# Patient Record
Sex: MALE | Race: WHITE | NOT HISPANIC OR LATINO | Employment: OTHER | URBAN - METROPOLITAN AREA
[De-identification: names, ages, dates, MRNs, and addresses within clinical notes are randomized per-mention and may not be internally consistent; named-entity substitution may affect disease eponyms.]

---

## 2018-06-18 ENCOUNTER — OFFICE VISIT (OUTPATIENT)
Dept: NEUROLOGY | Facility: CLINIC | Age: 78
End: 2018-06-18

## 2018-06-18 VITALS
SYSTOLIC BLOOD PRESSURE: 124 MMHG | BODY MASS INDEX: 27.62 KG/M2 | WEIGHT: 203.93 LBS | HEIGHT: 72 IN | DIASTOLIC BLOOD PRESSURE: 68 MMHG

## 2018-06-18 DIAGNOSIS — G43.109 MIGRAINE AURA WITHOUT HEADACHE: ICD-10-CM

## 2018-06-18 DIAGNOSIS — R47.89 EPISODE OF CHANGE IN SPEECH: Primary | ICD-10-CM

## 2018-06-18 PROCEDURE — 99215 OFFICE O/P EST HI 40 MIN: CPT | Performed by: PHYSICIAN ASSISTANT

## 2018-06-18 NOTE — PROGRESS NOTES
"Subjective     Chief Complaint: episode of difficulty with speech      History of Present Illness   Bryan Khan is a 77 y.o. male who returns to clinic today for evaluation of an episode of difficulty with speech. On 2/23/18, he noted very mild word finding difficulties shortly after working out at home. He denies any additional associated symptoms, including slurred speech, weakness, dysphagia or changes in vision. It is unclear how long this lasted. His blood pressure was noted to be 210/100 at this time. He was seen at [facility] where a head CT was unremarkable as well an echo and CTA of the head and neck. He was diagnosed with possible hypertensive urgency/emergency. He is currently taking ASA 81mg daily.     He was previously seen by Dr. Breen for migraines in 7/16. He initially noted symptoms several years ago marked by a visual aura beginning on the right and then spreading to the left. This typically lasts for approximately 20 minutes. Afterwards, he may or may not experience a headache. These episodes are very infrequent and may occur 1-2 times a year. He denies any additional associated symptoms or modifying factors.       I have reviewed and confirmed the past family, social and medical history as accurate on 6/19/18.     Review of Systems   Constitutional: Negative.    HENT: Negative.    Eyes: Negative.    Respiratory: Negative.    Cardiovascular: Negative.    Gastrointestinal: Negative.    Endocrine: Negative.    Genitourinary: Negative.    Musculoskeletal: Negative.    Skin: Negative.    Allergic/Immunologic: Negative.    Hematological: Negative.    Psychiatric/Behavioral: Negative.        Objective     Ht 182.9 cm (72.01\")   Wt 92.5 kg (203 lb 14.8 oz)   BMI 27.65 kg/m²     General appearance today is normal.       Physical Exam   Neurological: He has normal strength. He has a normal Finger-Nose-Finger Test. Gait normal.   Reflex Scores:       Tricep reflexes are 1+ on the right side " and 1+ on the left side.       Bicep reflexes are 1+ on the right side and 1+ on the left side.       Brachioradialis reflexes are 1+ on the right side and 1+ on the left side.       Patellar reflexes are 1+ on the right side and 1+ on the left side.  Psychiatric: His speech is normal.        Neurologic Exam     Mental Status   Speech: speech is normal   Level of consciousness: alert  Normal comprehension.     Cranial Nerves   Cranial nerves II through XII intact.     Motor Exam   Muscle bulk: normal  Overall muscle tone: normal    Strength   Strength 5/5 throughout.     Sensory Exam   Light touch normal.     Gait, Coordination, and Reflexes     Gait  Gait: normal    Coordination   Finger to nose coordination: normal    Tremor   Resting tremor: absent    Reflexes   Right brachioradialis: 1+  Left brachioradialis: 1+  Right biceps: 1+  Left biceps: 1+  Right triceps: 1+  Left triceps: 1+  Right patellar: 1+  Left patellar: 1+        Assessment/Plan   Bryan was seen today for migraine.    Diagnoses and all orders for this visit:    Episode of change in speech    Migraine aura without headache          Discussion/Summary   Bryan Khan returns to clinic today for evaluation of an episode of mild word finding difficulties. The etiology is unclear, though given his history I am concerned that his symptoms were related to hypertension. I offered to obtain an MRI of the brain, though this was very reasonably declined as he is currently doing well. His workup otherwise has been complete and appropriate. After discussing potential treatment options, it was elected to continue on ASA unchanged for stroke prevention. We discussed potentially adding a statin, though this was reasonably declined for now. As for his migraines, it was not elected to add any preventative medications at this time, at the episodes are so infrequent. He will then follow up in our clinic on an as needed basis.    I spent 35 minutes out of 45 minutes  face to face with the patient and discussing diagnosis, prognosis, diagnostic testing, evaluation, current status, treatment options and management as discussed above.       As part of this visit I reviewed radiology results and reviewed outside records.      Sho Red PA-C

## 2018-06-19 PROBLEM — R47.89 EPISODE OF CHANGE IN SPEECH: Status: ACTIVE | Noted: 2018-06-19

## 2018-06-19 PROBLEM — G43.109 MIGRAINE AURA WITHOUT HEADACHE: Status: ACTIVE | Noted: 2018-06-19

## 2021-07-13 ENCOUNTER — OFFICE VISIT (OUTPATIENT)
Dept: NEUROLOGY | Facility: CLINIC | Age: 81
End: 2021-07-13

## 2021-07-13 ENCOUNTER — LAB (OUTPATIENT)
Dept: LAB | Facility: HOSPITAL | Age: 81
End: 2021-07-13

## 2021-07-13 VITALS
TEMPERATURE: 98.4 F | HEART RATE: 67 BPM | DIASTOLIC BLOOD PRESSURE: 90 MMHG | HEIGHT: 70 IN | SYSTOLIC BLOOD PRESSURE: 180 MMHG | BODY MASS INDEX: 29.35 KG/M2 | WEIGHT: 205 LBS | OXYGEN SATURATION: 98 %

## 2021-07-13 DIAGNOSIS — R25.1 TREMOR: Primary | ICD-10-CM

## 2021-07-13 DIAGNOSIS — G43.109 MIGRAINE AURA WITHOUT HEADACHE: ICD-10-CM

## 2021-07-13 LAB — TSH SERPL DL<=0.05 MIU/L-ACNC: 1.29 UIU/ML (ref 0.27–4.2)

## 2021-07-13 PROCEDURE — 84443 ASSAY THYROID STIM HORMONE: CPT | Performed by: PHYSICIAN ASSISTANT

## 2021-07-13 PROCEDURE — 36415 COLL VENOUS BLD VENIPUNCTURE: CPT | Performed by: PHYSICIAN ASSISTANT

## 2021-07-13 PROCEDURE — 99204 OFFICE O/P NEW MOD 45 MIN: CPT | Performed by: PHYSICIAN ASSISTANT

## 2021-07-13 RX ORDER — PRIMIDONE 50 MG/1
50 TABLET ORAL 4 TIMES DAILY
Qty: 30 TABLET | Refills: 11 | Status: SHIPPED | OUTPATIENT
Start: 2021-07-13 | End: 2022-07-15

## 2021-07-13 RX ORDER — AMLODIPINE BESYLATE 5 MG/1
TABLET ORAL
COMMUNITY
Start: 2021-07-07 | End: 2022-11-07

## 2021-07-13 RX ORDER — IBUPROFEN 800 MG/1
TABLET ORAL
COMMUNITY
Start: 2021-06-29 | End: 2022-12-13 | Stop reason: ALTCHOICE

## 2021-07-13 RX ORDER — HYDROCHLOROTHIAZIDE 12.5 MG/1
CAPSULE, GELATIN COATED ORAL
COMMUNITY
Start: 2021-07-07 | End: 2022-12-01 | Stop reason: SDUPTHER

## 2021-07-13 RX ORDER — FLUOROMETHOLONE 0.1 %
SUSPENSION, DROPS(FINAL DOSAGE FORM)(ML) OPHTHALMIC (EYE)
COMMUNITY
Start: 2021-06-07 | End: 2022-12-13 | Stop reason: ALTCHOICE

## 2021-07-13 RX ORDER — LISINOPRIL 40 MG/1
40 TABLET ORAL DAILY
COMMUNITY
Start: 2021-07-07 | End: 2023-03-24 | Stop reason: ALTCHOICE

## 2021-07-14 NOTE — PROGRESS NOTES
Subjective       Chief Complaint: migraine, tremor      History of Present Illness   Bryan Khan is a 80 y.o. male who returns to clinic for evaluation of migraines. He initially noted symptoms several years ago marked by a visual aura beginning on the right and then spreading to the left. This typically lasts for approximately 20 minutes. Afterwards, he may or may not experience a headache. These episodes are very infrequent and may occur 1-2 times a year. He denies any additional associated symptoms or modifying factors.       He was previously evaluated in clinic in 6/2018 following an episode of difficulty with speech. On 2/23/18, he noted very mild word finding difficulties shortly after working out at home. He denies any additional associated symptoms, including slurred speech, weakness, dysphagia or changes in vision. It is unclear how long this lasted. His blood pressure was noted to be 210/100 at this time. He was seen at [facility] where a head CT was unremarkable as well an echo and CTA of the head and neck. He was diagnosed with possible hypertensive urgency/emergency. He is currently taking ASA 81mg daily. He denies     Today: Since his last visit in 6/2018, he denies any recurrent episodes of speech difficulty. He has noted 2-3 episodes of visual aura over the last year, with no clear trigger. 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 This has worsened over time. He primarily notes symptoms while eating, writing, and holding heavy objects in his hands such as a large cup of coffee. This is worse with caffeine intake. He denies any additional associated symptoms.      I have reviewed and confirmed the past family, social and medical history as accurate on 7/14/21.     Review of Systems   Constitutional: Negative.    HENT: Negative.    Eyes: Negative.    Respiratory: Negative.    Cardiovascular: Negative.    Gastrointestinal: Negative.   "  Endocrine: Negative.    Genitourinary: Negative.    Musculoskeletal: Negative.    Skin: Negative.    Allergic/Immunologic: Negative.    Neurological: Positive for tremors.   Hematological: Negative.        Objective     /90   Pulse 67   Temp 98.4 °F (36.9 °C)   Ht 177.8 cm (70\")   Wt 93 kg (205 lb)   SpO2 98%   BMI 29.41 kg/m²     General appearance today is normal.       Physical Exam  Neurological:      Coordination: Finger-Nose-Finger Test and Heel to Shin Test normal.      Gait: Gait is intact.      Deep Tendon Reflexes: Strength normal.   Psychiatric:         Speech: Speech normal.          Neurologic Exam     Mental Status   Speech: speech is normal   Level of consciousness: alert  Normal comprehension.     Cranial Nerves   Cranial nerves II through XII intact.     Motor Exam   Muscle bulk: normal  Overall muscle tone: normal    Strength   Strength 5/5 throughout.     Sensory Exam   Light touch normal.     Gait, Coordination, and Reflexes     Gait  Gait: normal    Coordination   Finger to nose coordination: normal  Heel to shin coordination: normal    Tremor   Resting tremor: absent  Intention tremor: mild.        Assessment/Plan   Diagnoses and all orders for this visit:    1. Tremor (Primary)  -     TSH    2. Migraine aura without headache    Other orders  -     primidone (MYSOLINE) 50 MG tablet; Take 1 tablet by mouth 4 (Four) Times a Day.  Dispense: 30 tablet; Refill: 11          Discussion/Summary   Bryan Khan comes to clinic today with a history of migraines as well as a history and examination most consistent with Essential Tremor. This was discussed at length. It was elected to obtain screening blood work. After discussing potential treatment options, it was elected to add primidone. He will then follow up in 6 months , or sooner if needed.   Total time of visit today: 45 minutes. As part of this visit I reviewed outside records. I also discussed diagnosis, prognosis, diagnostic " testing, evaluation, current status, treatment options and management as discussed above.             Sho Red PA-C

## 2022-01-26 ENCOUNTER — OFFICE VISIT (OUTPATIENT)
Dept: NEUROLOGY | Facility: CLINIC | Age: 82
End: 2022-01-26

## 2022-01-26 DIAGNOSIS — R25.1 TREMOR: Primary | ICD-10-CM

## 2022-01-26 DIAGNOSIS — G43.109 MIGRAINE AURA WITHOUT HEADACHE: ICD-10-CM

## 2022-01-26 PROCEDURE — 99215 OFFICE O/P EST HI 40 MIN: CPT | Performed by: PHYSICIAN ASSISTANT

## 2022-01-26 RX ORDER — DOXAZOSIN 2 MG/1
4 TABLET ORAL
COMMUNITY
Start: 2022-01-21 | End: 2022-12-13 | Stop reason: ALTCHOICE

## 2022-01-26 RX ORDER — BISOPROLOL FUMARATE AND HYDROCHLOROTHIAZIDE 5; 6.25 MG/1; MG/1
TABLET ORAL
COMMUNITY
Start: 2022-01-22 | End: 2022-08-16

## 2022-01-26 RX ORDER — TAMSULOSIN HYDROCHLORIDE 0.4 MG/1
CAPSULE ORAL
COMMUNITY

## 2022-01-27 NOTE — PROGRESS NOTES
Subjective         Chief Complaint: migraines, tremor     History of Present Illness   Bryan Khan is a 81 y.o. male who returns to clinic for evaluation of migraines. He initially noted symptoms several years ago marked by a visual aura beginning on the right and then spreading to the left. This typically lasts for approximately 20 minutes. Afterwards, he may or may not experience a headache. These episodes are very infrequent and may occur 1-2 times a year. He denies any additional associated symptoms or modifying factors.       He was previously evaluated in clinic in 6/2018 following an episode of difficulty with speech. On 2/23/18, he noted very mild word finding difficulties shortly after working out at home. He denies any additional associated symptoms, including slurred speech, weakness, dysphagia or changes in vision. It is unclear how long this lasted. His blood pressure was noted to be 210/100 at this time. He was seen at [facility] where a head CT was unremarkable as well an echo and CTA of the head and neck. He was diagnosed with possible hypertensive urgency/emergency. He is currently taking ASA 81mg daily.    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 This has worsened over time. He primarily notes symptoms while eating, writing, and holding heavy objects in his hands such as a large cup of coffee. This is worse with caffeine intake. He denies any additional associated symptoms.    Today: Since his last visit in 7/21, he notes that his tremor is somewhat improved with primidone 50mg nightly and is manageable.        I have reviewed and confirmed the past family, social and medical history as accurate on 1/26/22.     Review of Systems   Constitutional: Negative.    HENT: Negative.    Eyes: Negative.    Respiratory: Negative.    Cardiovascular: Negative.    Gastrointestinal: Negative.    Endocrine: Negative.    Genitourinary: Negative.     Musculoskeletal: Negative.    Skin: Negative.    Allergic/Immunologic: Negative.    Hematological: Negative.        Objective         General appearance today is normal.         Physical Exam  Neurological:      Coordination: Finger-Nose-Finger Test normal.      Deep Tendon Reflexes: Strength normal.   Psychiatric:         Speech: Speech normal.          Neurologic Exam     Mental Status   Speech: speech is normal   Level of consciousness: alert  Normal comprehension.     Cranial Nerves   Cranial nerves II through XII intact.     Motor Exam   Muscle bulk: normal  Overall muscle tone: normal    Strength   Strength 5/5 throughout.     Gait, Coordination, and Reflexes     Coordination   Finger to nose coordination: normal    Tremor   Resting tremor: absent  Intention tremor: absent        Assessment/Plan   Diagnoses and all orders for this visit:    1. Tremor (Primary)  -     Ambulatory Referral to Internal Medicine    2. Migraine aura without headache  -     Ambulatory Referral to Internal Medicine          Discussion/Summary   Bryan Khan returns to clinic today with a history of migraines and Essential Tremor. I again reviewed his current status and treatment options. After discussing potential treatment options, it was elected to continue on primidone unchanged. He would also like to establish care with a PCP, so I have placed an order. He will then follow up in 6 months , or sooner if needed.   Total time of visit today: 40 minutes. As part of this visit I reviewed prior lab results. I also discussed diagnosis, prognosis, evaluation, current status, treatment options and management as discussed above.         Sho Red PA-C

## 2022-07-15 RX ORDER — PRIMIDONE 50 MG/1
TABLET ORAL
Qty: 90 TABLET | Refills: 3 | Status: SHIPPED | OUTPATIENT
Start: 2022-07-15

## 2022-08-02 ENCOUNTER — OFFICE VISIT (OUTPATIENT)
Dept: NEUROLOGY | Facility: CLINIC | Age: 82
End: 2022-08-02

## 2022-08-02 VITALS
RESPIRATION RATE: 18 BRPM | HEART RATE: 63 BPM | SYSTOLIC BLOOD PRESSURE: 150 MMHG | DIASTOLIC BLOOD PRESSURE: 80 MMHG | OXYGEN SATURATION: 97 %

## 2022-08-02 DIAGNOSIS — R25.1 TREMOR: Primary | ICD-10-CM

## 2022-08-02 PROCEDURE — 99214 OFFICE O/P EST MOD 30 MIN: CPT | Performed by: PHYSICIAN ASSISTANT

## 2022-08-11 ENCOUNTER — TELEPHONE (OUTPATIENT)
Dept: FAMILY MEDICINE CLINIC | Facility: CLINIC | Age: 82
End: 2022-08-11

## 2022-08-11 NOTE — TELEPHONE ENCOUNTER
Caller: Bryan Khan    Relationship: Self    Best call back number:[phone]    What was the call regarding:   PATIENT WOULD LIKE A CALL BACK REGARDING QUESTION'S HE HAS ABOUT HIS UPCOMING APPOINTMENT 08/16/2022    Do you require a callback: YES

## 2022-08-11 NOTE — TELEPHONE ENCOUNTER
"HUB TO READ    \"CALLED AND SPOKE TO HIM, HE JUST WANTS TO KNOW IF HE CAN GET A NEW PT PACKET TO FILL OUT AT HOME BEFORE HIS APPT ON Tuesday. THE ANSWER TO HIS QUESTION IS YES, HE CAN COME IN AND GET ONE FROM THE . I THINK I LOST HIM BEFORE I GOT BACK TO MY DESK.\"  "

## 2022-08-16 ENCOUNTER — OFFICE VISIT (OUTPATIENT)
Dept: FAMILY MEDICINE CLINIC | Facility: CLINIC | Age: 82
End: 2022-08-16

## 2022-08-16 ENCOUNTER — TELEPHONE (OUTPATIENT)
Dept: FAMILY MEDICINE CLINIC | Facility: CLINIC | Age: 82
End: 2022-08-16

## 2022-08-16 VITALS
OXYGEN SATURATION: 94 % | HEART RATE: 59 BPM | DIASTOLIC BLOOD PRESSURE: 80 MMHG | WEIGHT: 212 LBS | BODY MASS INDEX: 28.71 KG/M2 | SYSTOLIC BLOOD PRESSURE: 148 MMHG | HEIGHT: 72 IN

## 2022-08-16 DIAGNOSIS — N40.0 BENIGN PROSTATIC HYPERPLASIA WITHOUT LOWER URINARY TRACT SYMPTOMS: ICD-10-CM

## 2022-08-16 DIAGNOSIS — R53.83 FATIGUE, UNSPECIFIED TYPE: ICD-10-CM

## 2022-08-16 DIAGNOSIS — R25.1 TREMOR: ICD-10-CM

## 2022-08-16 DIAGNOSIS — R42 DIZZINESS: Primary | ICD-10-CM

## 2022-08-16 DIAGNOSIS — I10 PRIMARY HYPERTENSION: ICD-10-CM

## 2022-08-16 PROCEDURE — 36415 COLL VENOUS BLD VENIPUNCTURE: CPT | Performed by: FAMILY MEDICINE

## 2022-08-16 PROCEDURE — 99203 OFFICE O/P NEW LOW 30 MIN: CPT | Performed by: FAMILY MEDICINE

## 2022-08-16 RX ORDER — HYDRALAZINE HYDROCHLORIDE 50 MG/1
50 TABLET, FILM COATED ORAL 2 TIMES DAILY
Qty: 60 TABLET | Refills: 1 | Status: SHIPPED | OUTPATIENT
Start: 2022-08-16 | End: 2022-10-12

## 2022-08-16 RX ORDER — BISOPROLOL FUMARATE 5 MG/1
TABLET, FILM COATED ORAL
COMMUNITY
Start: 2022-07-15 | End: 2022-08-16 | Stop reason: SDUPTHER

## 2022-08-16 RX ORDER — FUROSEMIDE 20 MG/1
20 TABLET ORAL DAILY
Start: 2022-08-16 | End: 2023-01-11 | Stop reason: ALTCHOICE

## 2022-08-16 RX ORDER — BISOPROLOL FUMARATE 5 MG/1
2.5 TABLET, FILM COATED ORAL DAILY
Qty: 15 TABLET | Refills: 1 | Status: SHIPPED | OUTPATIENT
Start: 2022-08-16 | End: 2022-09-29

## 2022-08-16 NOTE — PROGRESS NOTES
New Patient Office Visit      Date of Visit:  2022   Patient Name: Bryan Khan  : 1940   MRN: [id number]     Chief Complaint:    Chief Complaint   Patient presents with   • Hypertension       History of Present Illness: Bryan Khan is a 81 y.o. male who is here today to establish care.  Patient coming in to discuss dizziness and high blood pressure.  Ongoing complaint for quite a few months.  Has also mentioned to neurology.  Felt most likely to be either medication related or blood pressure related.  Reviewed past medical history as well as his current medications.        Subjective      Review of Systems:   Review of Systems   Constitutional: Negative for fatigue and fever.   HENT: Negative for congestion and ear pain.    Respiratory: Negative for apnea, cough, chest tightness and shortness of breath.    Cardiovascular: Negative for chest pain.   Gastrointestinal: Negative for abdominal pain, constipation, diarrhea and nausea.   Musculoskeletal: Negative for arthralgias.   Neurological: Positive for dizziness.   Psychiatric/Behavioral: Negative for depressed mood and stress.       Past Medical History:   Past Medical History:   Diagnosis Date   • Arthritis    • Cancer (HCC)    • Hypertension    • Migraine        Past Surgical History:   Past Surgical History:   Procedure Laterality Date   • APPENDECTOMY         Family History:   Family History   Problem Relation Age of Onset   • Cancer Mother    • Alzheimer's disease Mother    • Heart disease Father    • Hypertension Father    • Heart disease Brother    • Hypertension Brother    • Dementia Maternal Grandmother        Social History:   Social History     Socioeconomic History   • Marital status:    Tobacco Use   • Smoking status: Former Smoker     Quit date: 1967     Years since quittin.6   • Smokeless tobacco: Never Used   Vaping Use   • Vaping Use: Never used   Substance and Sexual Activity   • Alcohol use: Yes      "Alcohol/week: 2.0 standard drinks     Types: 2 Glasses of wine per week   • Drug use: No   • Sexual activity: Defer       Medications:     Current Outpatient Medications:   •  amLODIPine (NORVASC) 5 MG tablet, , Disp: , Rfl:   •  bisoprolol (ZEBeta) 5 MG tablet, Take 0.5 tablets by mouth Daily. Half tablet daily.   Please cut for pt., Disp: 15 tablet, Rfl: 1  •  doxazosin (CARDURA) 2 MG tablet, 4 mg., Disp: , Rfl:   •  lisinopril (PRINIVIL,ZESTRIL) 40 MG tablet, , Disp: , Rfl:   •  primidone (MYSOLINE) 50 MG tablet, TAKE 1/2 TABLET BY MOUTH ONCE NIGHTLY FOR ONE WEEK THEN TAKE ONE TABLET BY MOUTH ONCE NIGHTLY, Disp: 90 tablet, Rfl: 3  •  tamsulosin (FLOMAX) 0.4 MG capsule 24 hr capsule, tamsulosin 0.4 mg capsule  Take 1 capsule every day by oral route., Disp: , Rfl:   •  fluorometholone (FML) 0.1 % ophthalmic suspension, , Disp: , Rfl:   •  furosemide (Lasix) 20 MG tablet, Take 1 tablet by mouth Daily., Disp: , Rfl:   •  hydrALAZINE (APRESOLINE) 50 MG tablet, Take 1 tablet by mouth 2 (Two) Times a Day., Disp: 60 tablet, Rfl: 1  •  hydroCHLOROthiazide (MICROZIDE) 12.5 MG capsule, , Disp: , Rfl:   •  ibuprofen (ADVIL,MOTRIN) 800 MG tablet, , Disp: , Rfl:     Allergies:   No Known Allergies    Objective     Physical Exam:  Vital Signs:   Vitals:    08/16/22 1054 08/16/22 1110   BP: 164/94 148/80   Pulse: 59    SpO2: 94%    Weight: 96.2 kg (212 lb)    Height: 182.9 cm (72\")      Body mass index is 28.75 kg/m².     Physical Exam  Vitals and nursing note reviewed.   Constitutional:       General: He is not in acute distress.     Appearance: Normal appearance. He is not ill-appearing.   HENT:      Head: Normocephalic and atraumatic.      Right Ear: Tympanic membrane and ear canal normal.      Left Ear: Tympanic membrane and ear canal normal.      Nose: Nose normal.   Cardiovascular:      Rate and Rhythm: Normal rate and regular rhythm.      Heart sounds: Normal heart sounds.   Pulmonary:      Effort: Pulmonary effort is " normal.      Breath sounds: Normal breath sounds.   Neurological:      Mental Status: He is alert and oriented to person, place, and time. Mental status is at baseline.   Psychiatric:         Mood and Affect: Mood normal.         Assessment / Plan      Assessment/Plan:   Diagnoses and all orders for this visit:    1. Dizziness (Primary)  -     CBC Auto Differential; Future  -     Comprehensive Metabolic Panel; Future  -     Lipid Panel; Future  -     TSH; Future  -     CBC Auto Differential  -     Comprehensive Metabolic Panel  -     Lipid Panel  -     TSH    2. Primary hypertension  -     CBC Auto Differential; Future  -     Comprehensive Metabolic Panel; Future  -     Lipid Panel; Future  -     TSH; Future  -     CBC Auto Differential  -     Comprehensive Metabolic Panel  -     Lipid Panel  -     TSH    3. Benign prostatic hyperplasia without lower urinary tract symptoms    4. Tremor    5. Fatigue, unspecified type  -     Vitamin B12; Future  -     Vitamin B12    Other orders  -     furosemide (Lasix) 20 MG tablet; Take 1 tablet by mouth Daily.  -     hydrALAZINE (APRESOLINE) 50 MG tablet; Take 1 tablet by mouth 2 (Two) Times a Day.  Dispense: 60 tablet; Refill: 1  -     bisoprolol (ZEBeta) 5 MG tablet; Take 0.5 tablets by mouth Daily. Half tablet daily.   Please cut for pt.  Dispense: 15 tablet; Refill: 1         Checking blood work today.  I think that is combination of medications could be contributing to his dizziness.  Start the weaning process of doxazosin since he is on Flomax.  He is taking the doxazosin more for blood pressure but he just darted Flomax not too long ago and this should be good enough.  Trial of hydralazine.  We may try to stop the Lasix soon.  This was not started due to any edema or congestive heart failure.  Only blood pressure control.  See back in a few weeks.    Follow Up:   Return in about 6 weeks (around 9/27/2022) for Annual Wellness-Provider, Recheck.    Bryan Degroot  [facility]  [facility] [place]

## 2022-08-16 NOTE — TELEPHONE ENCOUNTER
Caller: Bryan Khan    Relationship: Self    Best call back number: [phone]    What is the best time to reach you: ANYTIME    Who are you requesting to speak with (clinical staff, provider,  specific staff member): CLINICAL    Do you know the name of the person who called: PATIENT     What was the call regarding: PATIENT STATES HE HAS SOME MEDICATIONS THAT HE NEEDS TO GO OVER WITH DR PERAZA'S NURSE    PLEASE ADVISE    Do you require a callback: YES

## 2022-08-17 LAB
ALBUMIN SERPL-MCNC: 4.4 G/DL (ref 3.7–4.7)
ALBUMIN/GLOB SERPL: 2.2 {RATIO} (ref 1.2–2.2)
ALP SERPL-CCNC: 102 IU/L (ref 44–121)
ALT SERPL-CCNC: 18 IU/L (ref 0–44)
AST SERPL-CCNC: 21 IU/L (ref 0–40)
BASOPHILS # BLD AUTO: 0 X10E3/UL (ref 0–0.2)
BASOPHILS NFR BLD AUTO: 0 %
BILIRUB SERPL-MCNC: 0.5 MG/DL (ref 0–1.2)
BUN SERPL-MCNC: 13 MG/DL (ref 8–27)
BUN/CREAT SERPL: 16 (ref 10–24)
CALCIUM SERPL-MCNC: 9.1 MG/DL (ref 8.6–10.2)
CHLORIDE SERPL-SCNC: 95 MMOL/L (ref 96–106)
CHOLEST SERPL-MCNC: 156 MG/DL (ref 100–199)
CO2 SERPL-SCNC: 20 MMOL/L (ref 20–29)
CREAT SERPL-MCNC: 0.82 MG/DL (ref 0.76–1.27)
EGFRCR-CYS SERPLBLD CKD-EPI 2021: 89 ML/MIN/1.73
EOSINOPHIL # BLD AUTO: 0.2 X10E3/UL (ref 0–0.4)
EOSINOPHIL NFR BLD AUTO: 3 %
ERYTHROCYTE [DISTWIDTH] IN BLOOD BY AUTOMATED COUNT: 13 % (ref 11.6–15.4)
GLOBULIN SER CALC-MCNC: 2 G/DL (ref 1.5–4.5)
GLUCOSE SERPL-MCNC: 91 MG/DL (ref 65–99)
HCT VFR BLD AUTO: 40.5 % (ref 37.5–51)
HDLC SERPL-MCNC: 34 MG/DL
HGB BLD-MCNC: 14.2 G/DL (ref 13–17.7)
IMM GRANULOCYTES # BLD AUTO: 0 X10E3/UL (ref 0–0.1)
IMM GRANULOCYTES NFR BLD AUTO: 1 %
LDLC SERPL CALC-MCNC: 97 MG/DL (ref 0–99)
LYMPHOCYTES # BLD AUTO: 1.4 X10E3/UL (ref 0.7–3.1)
LYMPHOCYTES NFR BLD AUTO: 23 %
MCH RBC QN AUTO: 32.3 PG (ref 26.6–33)
MCHC RBC AUTO-ENTMCNC: 35.1 G/DL (ref 31.5–35.7)
MCV RBC AUTO: 92 FL (ref 79–97)
MONOCYTES # BLD AUTO: 0.5 X10E3/UL (ref 0.1–0.9)
MONOCYTES NFR BLD AUTO: 8 %
NEUTROPHILS # BLD AUTO: 4 X10E3/UL (ref 1.4–7)
NEUTROPHILS NFR BLD AUTO: 65 %
PLATELET # BLD AUTO: 234 X10E3/UL (ref 150–450)
POTASSIUM SERPL-SCNC: 4.9 MMOL/L (ref 3.5–5.2)
PROT SERPL-MCNC: 6.4 G/DL (ref 6–8.5)
RBC # BLD AUTO: 4.4 X10E6/UL (ref 4.14–5.8)
SODIUM SERPL-SCNC: 135 MMOL/L (ref 134–144)
TRIGL SERPL-MCNC: 137 MG/DL (ref 0–149)
TSH SERPL DL<=0.005 MIU/L-ACNC: 0.95 UIU/ML (ref 0.45–4.5)
VIT B12 SERPL-MCNC: 1068 PG/ML (ref 232–1245)
VLDLC SERPL CALC-MCNC: 25 MG/DL (ref 5–40)
WBC # BLD AUTO: 6.1 X10E3/UL (ref 3.4–10.8)

## 2022-08-18 NOTE — TELEPHONE ENCOUNTER
Please talk with him.  We wrote out the instructions.   He was to wean the doxazosin and start titrating up on the hydralyzine.   Someone please call him

## 2022-08-18 NOTE — TELEPHONE ENCOUNTER
Patient called regarding a new BP med that was added. He wants to know if he needs to continue the old BP med and the new one, or just the new one. Please advise

## 2022-09-12 ENCOUNTER — TELEPHONE (OUTPATIENT)
Dept: FAMILY MEDICINE CLINIC | Facility: CLINIC | Age: 82
End: 2022-09-12

## 2022-09-12 NOTE — TELEPHONE ENCOUNTER
Hub staff attempted to follow warm transfer process and was unsuccessful     Caller: Bryan Khan    Relationship to patient: Self    Best call back number: [phone]    Patient is needing: THE PATIENT STATES THAT HE IS HAVING SOME LIGHT HEADED NESS AND SOME SHORTNESS OF BREATH THE PATIENT WOULD LIKE A CALL BACK FROM [facility] HE IS NOT SURE IF THIS IS RELATED TO HIS BLOOD PRESSURE

## 2022-09-13 NOTE — TELEPHONE ENCOUNTER
Pt contacted he has appt to follow up at the end of the month. Suggested he bring some readings in. He said his o2 levels are good.

## 2022-09-13 NOTE — TELEPHONE ENCOUNTER
Will review his lab results and ill let him know he calling about them? He also wants to know if you want him to come before home for his PE for any type of fasting labs?

## 2022-09-14 NOTE — TELEPHONE ENCOUNTER
Pt contacted he said it kind of comes and goes a little bit but its hard to say he says it is about the same. He said he wants to make sure you check his PSA at PE and I told him we could do that. Basically sending this info to you for FYI.   He said he went to Dr. Hamm to follow up on that to.

## 2022-09-14 NOTE — TELEPHONE ENCOUNTER
His labs were actually great.  All of his blood counts were completely normal.  His kidney function and liver function were normal.  Sugar was great.  No issues with his cholesterol.  Electrolytes were just fine.  Because all of these labs were normal, he should need any further labs fasting.  Only if his sugar or cholesterol are abnormal.  These were normal however.  Check and see if he has had any improvement in his dizziness at all with the changes in medication that we did.

## 2022-09-29 ENCOUNTER — OFFICE VISIT (OUTPATIENT)
Dept: FAMILY MEDICINE CLINIC | Facility: CLINIC | Age: 82
End: 2022-09-29

## 2022-09-29 VITALS
SYSTOLIC BLOOD PRESSURE: 162 MMHG | HEIGHT: 72 IN | DIASTOLIC BLOOD PRESSURE: 80 MMHG | WEIGHT: 213 LBS | HEART RATE: 70 BPM | OXYGEN SATURATION: 98 % | BODY MASS INDEX: 28.85 KG/M2

## 2022-09-29 DIAGNOSIS — Z12.5 PROSTATE CANCER SCREENING: ICD-10-CM

## 2022-09-29 DIAGNOSIS — K57.32 DIVERTICULITIS OF LARGE INTESTINE WITHOUT PERFORATION OR ABSCESS WITHOUT BLEEDING: ICD-10-CM

## 2022-09-29 DIAGNOSIS — Z87.440 HISTORY OF UTI: Primary | ICD-10-CM

## 2022-09-29 LAB
BILIRUB BLD-MCNC: NEGATIVE MG/DL
CLARITY, POC: CLEAR
COLOR UR: YELLOW
EXPIRATION DATE: ABNORMAL
GLUCOSE UR STRIP-MCNC: NEGATIVE MG/DL
KETONES UR QL: NEGATIVE
LEUKOCYTE EST, POC: NEGATIVE
Lab: ABNORMAL
NITRITE UR-MCNC: NEGATIVE MG/ML
PH UR: 7 [PH] (ref 5–8)
PROT UR STRIP-MCNC: NEGATIVE MG/DL
RBC # UR STRIP: NEGATIVE /UL
SP GR UR: 1.01 (ref 1–1.03)
UROBILINOGEN UR QL: NORMAL

## 2022-09-29 PROCEDURE — 99214 OFFICE O/P EST MOD 30 MIN: CPT | Performed by: FAMILY MEDICINE

## 2022-09-29 PROCEDURE — 81003 URINALYSIS AUTO W/O SCOPE: CPT | Performed by: FAMILY MEDICINE

## 2022-09-29 RX ORDER — NEBIVOLOL 5 MG/1
5 TABLET ORAL DAILY
Qty: 30 TABLET | Refills: 2 | Status: SHIPPED | OUTPATIENT
Start: 2022-09-29 | End: 2022-11-07

## 2022-09-29 NOTE — PROGRESS NOTES
"The ABCs of the Annual Wellness Visit  Subsequent Medicare Wellness Visit    Chief Complaint   Patient presents with   • Annual Exam     Annual Wellness Exam.       Subjective    History of Present Illness:  Bryan Khan is a 81 y.o. male who presents for a Subsequent Medicare Wellness Visit.    The following portions of the patient's history were reviewed and   updated as appropriate: {history reviewed:20406::\"allergies\",\"current medications\",\"past family history\",\"past medical history\",\"past social history\",\"past surgical history\",\"problem list\"}.    Compared to one year ago, the patient feels his physical   health is {better worse same:78895}.    Compared to one year ago, the patient feels his mental   health is {better worse same:09857}.    Recent Hospitalizations:  {Hospital Admission Status in the last 365 days:48020}      Current Medical Providers:  Patient Care Team:  Bryan Degroot MD as PCP - General (Family Medicine)    Outpatient Medications Prior to Visit   Medication Sig Dispense Refill   • amLODIPine (NORVASC) 5 MG tablet      • bisoprolol (ZEBeta) 5 MG tablet Take 0.5 tablets by mouth Daily. Half tablet daily.   Please cut for pt. 15 tablet 1   • doxazosin (CARDURA) 2 MG tablet 4 mg.     • fluorometholone (FML) 0.1 % ophthalmic suspension      • furosemide (Lasix) 20 MG tablet Take 1 tablet by mouth Daily.     • hydrALAZINE (APRESOLINE) 50 MG tablet Take 1 tablet by mouth 2 (Two) Times a Day. 60 tablet 1   • hydroCHLOROthiazide (MICROZIDE) 12.5 MG capsule      • ibuprofen (ADVIL,MOTRIN) 800 MG tablet      • lisinopril (PRINIVIL,ZESTRIL) 40 MG tablet      • primidone (MYSOLINE) 50 MG tablet TAKE 1/2 TABLET BY MOUTH ONCE NIGHTLY FOR ONE WEEK THEN TAKE ONE TABLET BY MOUTH ONCE NIGHTLY 90 tablet 3   • tamsulosin (FLOMAX) 0.4 MG capsule 24 hr capsule tamsulosin 0.4 mg capsule   Take 1 capsule every day by oral route.       No facility-administered medications prior to visit.       No opioid " "medication identified on active medication list. I have reviewed chart for other potential  high risk medication/s and harmful drug interactions in the elderly.          Aspirin is not on active medication list.  {ASPIRIN NOT ON MEDICATION LIST INDICATED/NOT INDICATED:25802}.    Patient Active Problem List   Diagnosis   • Episode of change in speech   • Migraine aura without headache   • Dizziness     Advance Care Planning  Advance Directive is not on file.  {ACP Discussion, Advance Directive not in EMR:20965}          Objective    Vitals:    22 1358   BP: 162/80   Pulse: 70   SpO2: 98%   Weight: 96.6 kg (213 lb)   Height: 182.9 cm (72\")     Estimated body mass index is 28.89 kg/m² as calculated from the following:    Height as of this encounter: 182.9 cm (72\").    Weight as of this encounter: 96.6 kg (213 lb).    {BMI is >= 25 and <30. (Overweight) The following options were offered after discussion;:5522217104}      Does the patient have evidence of cognitive impairment? {Yes/No:32826}    Physical Exam  Lab Results   Component Value Date    CHLPL 156 2022    TRIG 137 2022    HDL 34 (L) 2022    LDL 97 2022    VLDL 25 2022            HEALTH RISK ASSESSMENT    Smoking Status:  Social History     Tobacco Use   Smoking Status Former Smoker   • Packs/day: 0.50   • Years: 10.00   • Pack years: 5.00   • Start date: 1957   • Quit date: 1967   • Years since quittin.7   Smokeless Tobacco Never Used     Alcohol Consumption:  Social History     Substance and Sexual Activity   Alcohol Use Yes   • Alcohol/week: 2.0 standard drinks   • Types: 2 Glasses of wine per week     Fall Risk Screen:    STEADI Fall Risk Assessment was completed, and patient is at LOW risk for falls.Assessment completed on:2022    Depression Screening:  PHQ-2/PHQ-9 Depression Screening 2022   Little Interest or Pleasure in Doing Things 0-->not at all   Feeling Down, Depressed or Hopeless 0-->not at " all   PHQ-9: Brief Depression Severity Measure Score 0       Health Habits and Functional and Cognitive Screening:  Functional & Cognitive Status 9/29/2022   Do you have difficulty preparing food and eating? No   Do you have difficulty bathing yourself, getting dressed or grooming yourself? No   Do you have difficulty using the toilet? No   Do you have difficulty moving around from place to place? No   Do you have trouble with steps or getting out of a bed or a chair? No   Current Diet Other   Dental Exam Up to date   Eye Exam Up to date   Exercise (times per week) 4 times per week   Current Exercises Include Aerobics   Do you need help using the phone?  No   Are you deaf or do you have serious difficulty hearing?  Yes   Do you need help with transportation? No   Do you need help shopping? No   Do you need help preparing meals?  No   Do you need help with housework?  No   Do you need help with laundry? No   Do you need help taking your medications? No   Do you need help managing money? No   Do you ever drive or ride in a car without wearing a seat belt? No   Have you felt unusual stress, anger or loneliness in the last month? No   Who do you live with? Spouse   If you need help, do you have trouble finding someone available to you? Yes   Have you been bothered in the last four weeks by sexual problems? No   Do you have difficulty concentrating, remembering or making decisions? No       Age-appropriate Screening Schedule:  Refer to the list below for future screening recommendations based on patient's age, sex and/or medical conditions. Orders for these recommended tests are listed in the plan section. The patient has been provided with a written plan.    Health Maintenance   Topic Date Due   • TDAP/TD VACCINES (1 - Tdap) Never done   • ZOSTER VACCINE (1 of 2) Never done   • INFLUENZA VACCINE  Never done              Assessment & Plan   CMS Preventative Services Quick Reference  Risk Factors Identified During  Encounter  {Medicare Wellness Risk Factors:88215}  The above risks/problems have been discussed with the patient.  Follow up actions/plans if indicated are seen below in the Assessment/Plan Section.  Pertinent information has been shared with the patient in the After Visit Summary.    Diagnoses and all orders for this visit:    1. History of UTI (Primary)  -     POCT urinalysis dipstick, automated        Follow Up:   No follow-ups on file.     An After Visit Summary and PPPS were made available to the patient.    {Optional Chart Navigation Links Wrapup  Review (Popup)  Advance Care Planning  Labs  CC  Problem List  Visit Diagnosis  Medications  Result Review  Imaging  [facility] Maintenance  Quality  BestPractice  SmartSets  SnapShot  Encounters  Notes  Media  Procedures :23}      {Time Spent-Use for E/M Coding (Optional):84378}

## 2022-09-30 LAB — PSA SERPL-MCNC: 8.3 NG/ML (ref 0–4)

## 2022-09-30 NOTE — PROGRESS NOTES
Follow Up Office Visit      Date of Visit:  2022   Patient Name: Bryan Khan  : 1940   MRN: [id number]     Chief Complaint:    Chief Complaint   Patient presents with   • Annual Exam     Annual Wellness Exam.        History of Present Illness: Bryan Khan is a 81 y.o. male who is here today for follow up.  Visit scheduled for annual wellness exam but we were not able to do this today.  Patient wanted to focus on his dizziness and blood pressure.  At our previous visit we had made medication adjustments to see if there would be improvement in dizziness.  The doxazosin was stopped and hydralazine was started.  He had no issues with this change but dizziness did not improve.  Blood pressure readings at home were stable.        Subjective      Review of Systems:   Review of Systems   Constitutional: Negative for fatigue and fever.   HENT: Negative for congestion and ear pain.    Respiratory: Negative for apnea, cough, chest tightness and shortness of breath.    Cardiovascular: Negative for chest pain.   Gastrointestinal: Negative for abdominal pain, constipation, diarrhea and nausea.   Musculoskeletal: Negative for arthralgias.   Neurological: Positive for dizziness.   Psychiatric/Behavioral: Negative for depressed mood and stress.       Past Medical History:   Past Medical History:   Diagnosis Date   • Arthritis    • Cancer (HCC)    • Hypertension    • Migraine        Past Surgical History:   Past Surgical History:   Procedure Laterality Date   • APPENDECTOMY         Family History:   Family History   Problem Relation Age of Onset   • Cancer Mother    • Alzheimer's disease Mother    • Heart disease Father    • Hypertension Father    • Heart disease Brother    • Hypertension Brother    • Dementia Maternal Grandmother        Social History:   Social History     Socioeconomic History   • Marital status:    Tobacco Use   • Smoking status: Former Smoker     Packs/day: 0.50     Years: 10.00      "Pack years: 5.00     Start date: 1957     Quit date: 1967     Years since quittin.7   • Smokeless tobacco: Never Used   Vaping Use   • Vaping Use: Never used   Substance and Sexual Activity   • Alcohol use: Yes     Alcohol/week: 2.0 standard drinks     Types: 2 Glasses of wine per week   • Drug use: No   • Sexual activity: Defer       Medications:     Current Outpatient Medications:   •  amLODIPine (NORVASC) 5 MG tablet, , Disp: , Rfl:   •  doxazosin (CARDURA) 2 MG tablet, 4 mg., Disp: , Rfl:   •  fluorometholone (FML) 0.1 % ophthalmic suspension, , Disp: , Rfl:   •  furosemide (Lasix) 20 MG tablet, Take 1 tablet by mouth Daily., Disp: , Rfl:   •  hydrALAZINE (APRESOLINE) 50 MG tablet, Take 1 tablet by mouth 2 (Two) Times a Day., Disp: 60 tablet, Rfl: 1  •  hydroCHLOROthiazide (MICROZIDE) 12.5 MG capsule, , Disp: , Rfl:   •  ibuprofen (ADVIL,MOTRIN) 800 MG tablet, , Disp: , Rfl:   •  lisinopril (PRINIVIL,ZESTRIL) 40 MG tablet, , Disp: , Rfl:   •  primidone (MYSOLINE) 50 MG tablet, TAKE 1/2 TABLET BY MOUTH ONCE NIGHTLY FOR ONE WEEK THEN TAKE ONE TABLET BY MOUTH ONCE NIGHTLY, Disp: 90 tablet, Rfl: 3  •  tamsulosin (FLOMAX) 0.4 MG capsule 24 hr capsule, tamsulosin 0.4 mg capsule  Take 1 capsule every day by oral route., Disp: , Rfl:   •  nebivolol (Bystolic) 5 MG tablet, Take 1 tablet by mouth Daily., Disp: 30 tablet, Rfl: 2    Allergies:   No Known Allergies    Objective     Physical Exam:  Vital Signs:   Vitals:    22 1358   BP: 162/80   Pulse: 70   SpO2: 98%   Weight: 96.6 kg (213 lb)   Height: 182.9 cm (72\")     Body mass index is 28.89 kg/m².     Physical Exam  Vitals and nursing note reviewed.   Constitutional:       General: He is not in acute distress.     Appearance: Normal appearance. He is not ill-appearing.   HENT:      Head: Normocephalic and atraumatic.      Right Ear: Tympanic membrane and ear canal normal.      Left Ear: Tympanic membrane and ear canal normal.      Nose: Nose normal. "   Cardiovascular:      Rate and Rhythm: Normal rate and regular rhythm.      Heart sounds: Normal heart sounds.   Pulmonary:      Effort: Pulmonary effort is normal.      Breath sounds: Normal breath sounds.   Neurological:      Mental Status: He is alert and oriented to person, place, and time. Mental status is at baseline.   Psychiatric:         Mood and Affect: Mood normal.         Procedures      Assessment / Plan      Assessment/Plan:   Diagnoses and all orders for this visit:    1. History of UTI (Primary)  -     POCT urinalysis dipstick, automated    2. Diverticulitis of large intestine without perforation or abscess without bleeding    3. Prostate cancer screening  -     PSA Screen; Future  -     PSA Screen    Other orders  -     nebivolol (Bystolic) 5 MG tablet; Take 1 tablet by mouth Daily.  Dispense: 30 tablet; Refill: 2         We will try to further simplify regimen.  We are going to stop now his bisoprolol as well as amlodipine.  Neither were maximized.  We will start Bystolic.  Less potential for side effects.  We will start with lower dose and increase.  Hopeful that we will start to see some difference in his dizziness.    Follow Up:   No follow-ups on file.    Bryan Degorot  [facility] [place]

## 2022-10-12 RX ORDER — HYDRALAZINE HYDROCHLORIDE 50 MG/1
TABLET, FILM COATED ORAL
Qty: 60 TABLET | Refills: 1 | Status: SHIPPED | OUTPATIENT
Start: 2022-10-12 | End: 2022-11-02 | Stop reason: SDUPTHER

## 2022-10-18 ENCOUNTER — TELEPHONE (OUTPATIENT)
Dept: FAMILY MEDICINE CLINIC | Facility: CLINIC | Age: 82
End: 2022-10-18

## 2022-10-18 NOTE — TELEPHONE ENCOUNTER
FYI: Pt called to schedule a day/time to complete his physical and to discuss med changes/bp with Dr. Degroot. Said he was told he would get worked in sooner than available due to BP concerns. Scheduled 11/7 @ 3:45.     Pt has been tracking his BP since last visit, and said it has fluctuated but still remains on the high side. He said diastolic has been fairly steady and stays in the 70s while the systolic number has gone from the low 120s up into the low 160s. Pt does say he tends to eat a higher sodium diet, plus drinks coffee, which he thinks might effect the numbers. He said he also exercises several times a week but has not seen the number fluctuate after activity.     Pt has been keeping track of BP for past few weeks. Said he will stop by this week and let us make a copy of his notes to put in his chart. If Dr. Degroot wants to see him sooner than 11/7 please contact Pt. [phone]

## 2022-10-18 NOTE — TELEPHONE ENCOUNTER
Pt called in - would like a callback from Lena or Karie today regarding setting up an appointment with Dr. Degroot sooner than what we have available. [phone]

## 2022-10-21 ENCOUNTER — TELEPHONE (OUTPATIENT)
Dept: FAMILY MEDICINE CLINIC | Facility: CLINIC | Age: 82
End: 2022-10-21

## 2022-10-21 NOTE — TELEPHONE ENCOUNTER
Pt came in and brought his blood pressure log that he was asked to bring and was requesting a call back to discuss what to do, wasn't sure of which nurse he was talking to but thinks it was bronwyn, but stated he needed a call back today to discuss it.

## 2022-10-21 NOTE — TELEPHONE ENCOUNTER
Called pt back and gave message. He says his pulse is running in the 50s. He brought a list of his readings today. Will have that scanned in.

## 2022-11-02 RX ORDER — HYDRALAZINE HYDROCHLORIDE 50 MG/1
50 TABLET, FILM COATED ORAL 3 TIMES DAILY
Qty: 90 TABLET | Refills: 2 | Status: SHIPPED | OUTPATIENT
Start: 2022-11-02 | End: 2022-11-07

## 2022-11-07 ENCOUNTER — OFFICE VISIT (OUTPATIENT)
Dept: FAMILY MEDICINE CLINIC | Facility: CLINIC | Age: 82
End: 2022-11-07

## 2022-11-07 VITALS
SYSTOLIC BLOOD PRESSURE: 160 MMHG | BODY MASS INDEX: 28.17 KG/M2 | DIASTOLIC BLOOD PRESSURE: 82 MMHG | HEIGHT: 72 IN | OXYGEN SATURATION: 98 % | WEIGHT: 208 LBS | HEART RATE: 58 BPM

## 2022-11-07 DIAGNOSIS — I10 PRIMARY HYPERTENSION: ICD-10-CM

## 2022-11-07 DIAGNOSIS — R42 DIZZINESS: Primary | ICD-10-CM

## 2022-11-07 DIAGNOSIS — Q17.8 EUSTACHIAN TUBE ANOMALY: ICD-10-CM

## 2022-11-07 PROCEDURE — 99214 OFFICE O/P EST MOD 30 MIN: CPT | Performed by: FAMILY MEDICINE

## 2022-11-07 RX ORDER — PREDNISONE 20 MG/1
TABLET ORAL
Qty: 18 TABLET | Refills: 0 | Status: SHIPPED | OUTPATIENT
Start: 2022-11-07 | End: 2022-12-13 | Stop reason: ALTCHOICE

## 2022-11-08 NOTE — PROGRESS NOTES
Follow Up Office Visit      Date of Visit:  2022   Patient Name: Bryan Khan  : 1940   MRN: [id number]     Chief Complaint:    Chief Complaint   Patient presents with   • Hypertension       History of Present Illness: Bryan Khan is a 81 y.o. male who is here today for follow up.  Patient following up today on hypertension and dizziness.  Multiple changes and hypertension medication has made no difference in dizziness.  Blood pressure higher with changes in blood pressure medication.        Subjective      Review of Systems:   Review of Systems   Constitutional: Negative for fatigue and fever.   HENT: Negative for congestion and ear pain.    Respiratory: Negative for apnea, cough, chest tightness and shortness of breath.    Cardiovascular: Negative for chest pain.   Gastrointestinal: Negative for abdominal pain, constipation, diarrhea and nausea.   Musculoskeletal: Negative for arthralgias.   Neurological: Positive for dizziness.   Psychiatric/Behavioral: Negative for depressed mood and stress.       Past Medical History:   Past Medical History:   Diagnosis Date   • Arthritis    • Cancer (HCC)    • Hypertension    • Migraine        Past Surgical History:   Past Surgical History:   Procedure Laterality Date   • APPENDECTOMY         Family History:   Family History   Problem Relation Age of Onset   • Cancer Mother    • Alzheimer's disease Mother    • Heart disease Father    • Hypertension Father    • Heart disease Brother    • Hypertension Brother    • Dementia Maternal Grandmother        Social History:   Social History     Socioeconomic History   • Marital status:    Tobacco Use   • Smoking status: Former     Packs/day: 0.50     Years: 10.00     Pack years: 5.00     Types: Cigarettes     Start date: 1957     Quit date: 1967     Years since quittin.8   • Smokeless tobacco: Never   Vaping Use   • Vaping Use: Never used   Substance and Sexual Activity   • Alcohol use: Yes      "Alcohol/week: 2.0 standard drinks     Types: 2 Glasses of wine per week   • Drug use: No   • Sexual activity: Defer       Medications:     Current Outpatient Medications:   •  doxazosin (CARDURA) 2 MG tablet, 4 mg., Disp: , Rfl:   •  fluorometholone (FML) 0.1 % ophthalmic suspension, , Disp: , Rfl:   •  furosemide (Lasix) 20 MG tablet, Take 1 tablet by mouth Daily., Disp: , Rfl:   •  hydroCHLOROthiazide (MICROZIDE) 12.5 MG capsule, , Disp: , Rfl:   •  ibuprofen (ADVIL,MOTRIN) 800 MG tablet, , Disp: , Rfl:   •  lisinopril (PRINIVIL,ZESTRIL) 40 MG tablet, , Disp: , Rfl:   •  predniSONE (DELTASONE) 20 MG tablet, 3 po qd x3 d then 2 po qd x3d then 1 po qd x3d, Disp: 18 tablet, Rfl: 0  •  primidone (MYSOLINE) 50 MG tablet, TAKE 1/2 TABLET BY MOUTH ONCE NIGHTLY FOR ONE WEEK THEN TAKE ONE TABLET BY MOUTH ONCE NIGHTLY, Disp: 90 tablet, Rfl: 3  •  tamsulosin (FLOMAX) 0.4 MG capsule 24 hr capsule, tamsulosin 0.4 mg capsule  Take 1 capsule every day by oral route., Disp: , Rfl:     Allergies:   No Known Allergies    Objective     Physical Exam:  Vital Signs:   Vitals:    11/07/22 1551   BP: 160/82   Pulse: 58   SpO2: 98%   Weight: 94.3 kg (208 lb)   Height: 182.9 cm (72\")     Body mass index is 28.21 kg/m².     Physical Exam  Vitals and nursing note reviewed.   Constitutional:       General: He is not in acute distress.     Appearance: Normal appearance. He is not ill-appearing.   HENT:      Head: Normocephalic and atraumatic.      Right Ear: Tympanic membrane and ear canal normal.      Left Ear: Tympanic membrane and ear canal normal.      Nose: Nose normal.   Cardiovascular:      Rate and Rhythm: Normal rate and regular rhythm.      Heart sounds: Normal heart sounds.   Pulmonary:      Effort: Pulmonary effort is normal.      Breath sounds: Normal breath sounds.   Neurological:      Mental Status: He is alert and oriented to person, place, and time. Mental status is at baseline.   Psychiatric:         Mood and Affect: Mood " normal.         Procedures      Assessment / Plan      Assessment/Plan:   Diagnoses and all orders for this visit:    1. Dizziness (Primary)  -     Ambulatory Referral to Neurology    2. Primary hypertension    3. Eustachian tube anomaly    Other orders  -     predniSONE (DELTASONE) 20 MG tablet; 3 po qd x3 d then 2 po qd x3d then 1 po qd x3d  Dispense: 18 tablet; Refill: 0         Patient continues to have dizziness.  At this point, doubtful that hypertension or hypotension is causing dizziness.  Patient wanting to see MD with neurology for second opinion.  Had been seeing neurology PA.  There also could be some vertigo going on.  Gave steroids.  Reverting back to old treatment for hypertension.  Patient will forward blood pressure readings.    Follow Up:   No follow-ups on file.    Bryan Degroot  [facility] [place] [facility] [place]

## 2022-11-14 ENCOUNTER — TELEPHONE (OUTPATIENT)
Dept: FAMILY MEDICINE CLINIC | Facility: CLINIC | Age: 82
End: 2022-11-14

## 2022-11-14 DIAGNOSIS — I10 BENIGN ESSENTIAL HTN: Primary | ICD-10-CM

## 2022-11-14 NOTE — TELEPHONE ENCOUNTER
Called patient but he refused to tell me what he needed or what he had questions about. Patient states Dr. Degroot will know what he is calling about.

## 2022-11-14 NOTE — TELEPHONE ENCOUNTER
THERE HAS BEEN A TOTAL BREAK DOWN WITH COMMUNICATION IN THIS OFFICE, HE HAS BEEN TRYING TO GET IN CONTACT WITH THIS OFFICE SINCE Thursday. HE WOULD LIKE TO SPEAK TO DR PERAZA DIRECTLY.

## 2022-11-14 NOTE — TELEPHONE ENCOUNTER
Pt stated that he is still having high bp issues. Stated that Dr Degroot should know about all his issues and that he wants him to call him back regarding his bp. He stated that he checked his bp 9 times and the average was 168/84. Said that his bp has been running high since the medication change.

## 2022-11-15 RX ORDER — NIFEDIPINE 30 MG/1
30 TABLET, EXTENDED RELEASE ORAL DAILY
Qty: 30 TABLET | Refills: 2 | Status: SHIPPED | OUTPATIENT
Start: 2022-11-15 | End: 2022-12-13 | Stop reason: SDUPTHER

## 2022-11-16 NOTE — ADDENDUM NOTE
Addended by: DARIEN PERAZA on: 11/15/2022 07:01 PM     Modules accepted: Orders     This is a surgical and/or non-medical patient.

## 2022-11-18 ENCOUNTER — TELEPHONE (OUTPATIENT)
Dept: FAMILY MEDICINE CLINIC | Facility: CLINIC | Age: 82
End: 2022-11-18

## 2022-11-18 NOTE — TELEPHONE ENCOUNTER
Provider: DR HAMILTON  Caller: DARIEN RYAN  Relationship to Patient: SELF  Phone Number: [phone]  Reason for Call: REFERRAL DID NOT TAKE. PATIENT STATED TO FORGET THE REFERRAL TO NEUROLOGY

## 2022-11-21 NOTE — TELEPHONE ENCOUNTER
Pt called back. He said he would like to be referred to Dr. Agustín Olivares @ [facility] if Dr. Degroot recommends him.

## 2022-11-21 NOTE — TELEPHONE ENCOUNTER
Called the pt back, he says he has been seeing Sho EASON with [facility]. He wanted to get a second opinion with a doctor. He got a call to schedule but it was with the same provider he has been seeing. He would like to see a neurologist with [facility] who was recommended by a friend and he wants to know what you think about this doctor. He will call back with the name, please do new referral to [facility].

## 2022-11-22 NOTE — TELEPHONE ENCOUNTER
Looks like they were working on scheduling him with an MD at [facility] but I will cancel that and fax referral to [facility] for Dr Olivares.

## 2022-11-30 ENCOUNTER — TELEPHONE (OUTPATIENT)
Dept: FAMILY MEDICINE CLINIC | Facility: CLINIC | Age: 82
End: 2022-11-30

## 2022-11-30 DIAGNOSIS — R42 DIZZINESS: ICD-10-CM

## 2022-11-30 DIAGNOSIS — I10 BENIGN ESSENTIAL HTN: Primary | ICD-10-CM

## 2022-12-01 NOTE — TELEPHONE ENCOUNTER
Caller: Bryan Khan    Relationship: Self    Best call back number: [phone]    Requested Prescriptions:   Requested Prescriptions     Pending Prescriptions Disp Refills   • hydroCHLOROthiazide (MICROZIDE) 12.5 MG capsule          Pharmacy where request should be sent: [facility] [id number] [place] [id number] [facility] [address] - [phone]  - [phone] FX     Additional details provided by patient: 2-3 LEFT.      Does the patient have less than a 3 day supply:  [x] Yes  [] No    Would you like a call back once the refill request has been completed: [] Yes [x] No    If the office needs to give you a call back, can they leave a voicemail: [] Yes [x] No    Elvia Garcia   12/01/22 10:31 EST

## 2022-12-02 RX ORDER — HYDROCHLOROTHIAZIDE 12.5 MG/1
12.5 CAPSULE, GELATIN COATED ORAL EVERY MORNING
Qty: 90 CAPSULE | Refills: 1 | Status: SHIPPED | OUTPATIENT
Start: 2022-12-02 | End: 2023-03-24 | Stop reason: SDUPTHER

## 2022-12-08 ENCOUNTER — TELEPHONE (OUTPATIENT)
Dept: FAMILY MEDICINE CLINIC | Facility: CLINIC | Age: 82
End: 2022-12-08

## 2022-12-08 NOTE — TELEPHONE ENCOUNTER
Caller: BillBryan    Relationship: Self    Best call back number: [phone]    What form or medical record are you requesting: MEDICAL RECORDS IN REGARDS TO BLOOD PRESSURE    Who is requesting this form or medical record from you: DR. JAIME     How would you like to receive the form or medical records (pick-up, mail, fax): FAX    Timeframe paperwork needed: AS SOON AS POSSIBLE    Additional notes: PLEASE SEND THESE RECORDS OVER AS SOON AS POSSIBLE, AS THE PATIENT WILL BE SEEN ON 12.13.22.    THANK YOU.

## 2022-12-13 ENCOUNTER — OFFICE VISIT (OUTPATIENT)
Dept: CARDIOLOGY | Facility: CLINIC | Age: 82
End: 2022-12-13

## 2022-12-13 VITALS
WEIGHT: 206 LBS | HEART RATE: 78 BPM | BODY MASS INDEX: 27.9 KG/M2 | SYSTOLIC BLOOD PRESSURE: 162 MMHG | OXYGEN SATURATION: 98 % | DIASTOLIC BLOOD PRESSURE: 84 MMHG | TEMPERATURE: 98 F | HEIGHT: 72 IN | RESPIRATION RATE: 18 BRPM

## 2022-12-13 DIAGNOSIS — I45.2 BIFASCICULAR BLOCK: ICD-10-CM

## 2022-12-13 DIAGNOSIS — R42 DIZZINESS: ICD-10-CM

## 2022-12-13 DIAGNOSIS — I10 ESSENTIAL HYPERTENSION: Primary | ICD-10-CM

## 2022-12-13 PROCEDURE — 99204 OFFICE O/P NEW MOD 45 MIN: CPT | Performed by: INTERNAL MEDICINE

## 2022-12-13 PROCEDURE — 93000 ELECTROCARDIOGRAM COMPLETE: CPT | Performed by: INTERNAL MEDICINE

## 2022-12-13 RX ORDER — NIFEDIPINE 60 MG/1
60 TABLET, FILM COATED, EXTENDED RELEASE ORAL DAILY
Qty: 90 TABLET | Refills: 1 | Status: SHIPPED | OUTPATIENT
Start: 2022-12-13 | End: 2023-02-27 | Stop reason: SDUPTHER

## 2022-12-13 NOTE — PROGRESS NOTES
[facility]  [facility] CARDIOLOGY  [address] [place] [address]  Dept: [phone]  Dept Fax: [phone]    Bryan Khan  1940    New Patient Office Note    History of Present Illness:  Bryan Khan is a 82 y.o. male who presents to the clinic for  Uncontrol Hypertension and  dizziness,  , He is 82 years old with Hypertension on Multiples meds Nifedipine 30 mg, lasix 20 mg, hctz 12,5  And Lisinopril 40 mg, has noticed BP in the 140 to 160 lately, has some dizziness and feel off level after standing, , denies any chest pain, SOB, edema, he ddi have edema before and he is on 2 water pills, he exercise and he does not have any complaints, his EKG sinus with bifascicular block, first degree AV block and LASHB, his Hr is 64 he states that his Hr has been slow since he was younger, his BP after standing does not changes 150.80 and also lying in both arms the same, might the feeling of being off cause by uncontrol BP possible, he does not smokes and no ETOH, his cardiac exam is normal, will increase Nifedipine to 60 mg daily, advised to take Lisinopril at night time, , will see him back in 4 weeks     The following portions of the patient's history were reviewed and updated as appropriate: allergies, current medications, past family history, past medical history, past social history, past surgical history and problem list.    Medications:  furosemide  hydroCHLOROthiazide  lisinopril  NIFEdipine CC  primidone  tamsulosin capsule    Subjective  No Known Allergies     Past Medical History:   Diagnosis Date   • Arthritis    • Cancer (HCC)    • Hypertension    • Migraine        Past Surgical History:   Procedure Laterality Date   • APPENDECTOMY         Family History   Problem Relation Age of Onset   • Cancer Mother    • Alzheimer's disease Mother    • Heart disease Father    • Hypertension Father    • Heart disease Brother    • Hypertension Brother    • Dementia Maternal  "Grandmother         Social History     Socioeconomic History   • Marital status:    Tobacco Use   • Smoking status: Former     Packs/day: 0.50     Years: 10.00     Pack years: 5.00     Types: Cigarettes     Start date: 1957     Quit date: 1967     Years since quittin.9   • Smokeless tobacco: Never   Vaping Use   • Vaping Use: Never used   Substance and Sexual Activity   • Alcohol use: Yes     Alcohol/week: 2.0 standard drinks     Types: 2 Glasses of wine per week   • Drug use: No   • Sexual activity: Defer       Review of Systems   Constitutional: Negative.    HENT: Negative.    Respiratory: Negative.    Cardiovascular: Negative.    Endocrine: Negative.    Genitourinary: Negative.    Musculoskeletal: Negative.    Skin: Negative.    Allergic/Immunologic: Negative.    Neurological: Positive for light-headedness.   Hematological: Negative.    Psychiatric/Behavioral: Negative.        Cardiovascular Procedures    ECHO/MUGA:   STRESS TESTS:   CARDIAC CATH:   DEVICES:   HOLTER:   CT/MRI:   VASCULAR:   CARDIOTHORACIC:     Objective  Vitals:    22 1512   BP: 162/84   BP Location: Right arm   Patient Position: Sitting   Cuff Size: Adult   Pulse: 78   Resp: 18   Temp: 98 °F (36.7 °C)   TempSrc: Infrared   SpO2: 98%   Weight: 93.4 kg (206 lb)   Height: 182.9 cm (72\")   PainSc: 0-No pain       Physical Exam  Constitutional:       Appearance: Healthy appearance. Not in distress.   Neck:      Vascular: No JVR. JVD normal.   Pulmonary:      Effort: Pulmonary effort is normal.      Breath sounds: Normal breath sounds. No wheezing. No rhonchi. No rales.   Chest:      Chest wall: Not tender to palpatation.   Cardiovascular:      PMI at left midclavicular line. Normal rate. Regular rhythm. Normal S1. Normal S2.      Murmurs: There is no murmur.      No gallop. No click. No rub.   Pulses:     Intact distal pulses.   Edema:     Peripheral edema absent.   Abdominal:      General: Bowel sounds are normal.      " Palpations: Abdomen is soft.      Tenderness: There is no abdominal tenderness.   Musculoskeletal: Normal range of motion.         General: No tenderness. Skin:     General: Skin is warm and dry.   Neurological:      General: No focal deficit present.      Mental Status: Alert and oriented to person, place and time.          Diagnostic Data    ECG 12 Lead    Date/Time: 12/13/2022 4:05 PM  Performed by: Bhavesh Greenwood MD  Authorized by: Bhavesh Greenwood MD   Comparison: compared with previous ECG   Similar to previous ECG  Rhythm: sinus rhythm  Rate: normal  BPM: 63  Conduction: left anterior fascicular block and 1st degree AV block  QRS axis: normal    Clinical impression: abnormal EKG            Assessment and Plan  Diagnoses and all orders for this visit:    Essential hypertension- BP is 150.80 will increase Nifedipine to 60 mg, keep Lisinopril 40 mg at night time, Hctz 12,5 and Lasix 20 mg    Dizziness- Might be related to uncontrol BP, no orthostatic changes , will observe  Bifascicular block- First degree AV block and also LASHB, rate is 63, no AV blockers,     Other orders  -     NIFEdipine XL (ADALAT CC) 60 MG 24 hr tablet; Take 1 tablet by mouth Daily.        Return in about 1 month (around 1/13/2023) for Recheck.    Bhavesh Greenwood MD  12/13/2022

## 2022-12-27 ENCOUNTER — TELEPHONE (OUTPATIENT)
Dept: FAMILY MEDICINE CLINIC | Facility: CLINIC | Age: 82
End: 2022-12-27

## 2022-12-27 NOTE — TELEPHONE ENCOUNTER
CHECKED DR PERAZA'S SCHEDULE THERE IS NOTHING SOONER CALLED PATIENT AND LET KNOW HE SAID THAT'S FINE AND TO LEAVE HIS APPOINTMENT AS IS

## 2022-12-27 NOTE — TELEPHONE ENCOUNTER
Caller: Bryan Khan    Relationship to patient: Self    Best call back number: [phone]    Chief complaint:SCHEDULING    Type of visit: FOLLOW UP REGARDING BLOOD PRESSURE    Requested date: BEFORE January 20TH     If rescheduling, when is the original appointment: 1/20     Additional notes:PATIENT CALLED TO SCHEDULE WITH PROVIDER TO BE SEEN BEFORE January 20TH.      PATIENT STATED HE HAS GONE AHEAD AND SEEN A CARDIOLOGIST, BUT WOULD LIKE TO TALK WITH DOCTOR PERAZA.

## 2023-01-11 ENCOUNTER — OFFICE VISIT (OUTPATIENT)
Dept: CARDIOLOGY | Facility: CLINIC | Age: 83
End: 2023-01-11
Payer: MEDICARE

## 2023-01-11 VITALS
DIASTOLIC BLOOD PRESSURE: 76 MMHG | HEIGHT: 72 IN | TEMPERATURE: 97.1 F | BODY MASS INDEX: 27.77 KG/M2 | SYSTOLIC BLOOD PRESSURE: 142 MMHG | RESPIRATION RATE: 18 BRPM | OXYGEN SATURATION: 96 % | WEIGHT: 205 LBS | HEART RATE: 78 BPM

## 2023-01-11 DIAGNOSIS — I10 ESSENTIAL HYPERTENSION: ICD-10-CM

## 2023-01-11 DIAGNOSIS — I45.2 BIFASCICULAR BLOCK: Primary | ICD-10-CM

## 2023-01-11 PROCEDURE — 99213 OFFICE O/P EST LOW 20 MIN: CPT | Performed by: INTERNAL MEDICINE

## 2023-01-11 NOTE — PROGRESS NOTES
[facility] [place]  [facility] CARDIOLOGY  [address] [place] [address]  Dept: [phone]  Dept Fax: [phone]    Bryan Khan  1940    Follow Up Office Visit Note    History of Present Illness:  Bryan Khan is a 82 y.o. male who presents to the clinic for Follow-up. Hypertension- BP is better 125.80      After we increase Nifedipine to 60 mg he is also on Lisinopril 40 mg and HCTZ 12,5. And lasix 20 mg, he complaints of going to the bathroom a lot, will vincent lasix     The following portions of the patient's history were reviewed and updated as appropriate: allergies, current medications, past family history, past medical history, past social history, past surgical history and problem list.    Medications:  hydroCHLOROthiazide  lisinopril  NIFEdipine CC  primidone  tamsulosin capsule    Subjective  No Known Allergies     Past Medical History:   Diagnosis Date   • Arthritis    • Cancer (HCC)    • Hypertension    • Migraine        Past Surgical History:   Procedure Laterality Date   • APPENDECTOMY         Family History   Problem Relation Age of Onset   • Cancer Mother    • Alzheimer's disease Mother    • Heart disease Father    • Hypertension Father    • Heart disease Brother    • Hypertension Brother    • Dementia Maternal Grandmother         Social History     Socioeconomic History   • Marital status:    Tobacco Use   • Smoking status: Former     Packs/day: 0.50     Years: 10.00     Pack years: 5.00     Types: Cigarettes     Start date: 1957     Quit date: 1967     Years since quittin.0   • Smokeless tobacco: Never   Vaping Use   • Vaping Use: Never used   Substance and Sexual Activity   • Alcohol use: Yes     Alcohol/week: 2.0 standard drinks     Types: 2 Glasses of wine per week   • Drug use: No   • Sexual activity: Defer       Review of Systems   Constitutional: Negative.    HENT: Negative.    Respiratory: Negative.    Cardiovascular: Negative.   "  Endocrine: Negative.    Genitourinary: Negative.    Musculoskeletal: Negative.    Skin: Negative.    Allergic/Immunologic: Negative.    Neurological: Negative.    Hematological: Negative.    Psychiatric/Behavioral: Negative.        Cardiovascular Procedures    ECHO/MUGA:   STRESS TESTS:   CARDIAC CATH:   DEVICES:   HOLTER:   CT/MRI:   VASCULAR:   CARDIOTHORACIC:     Objective  Vitals:    01/11/23 1338   BP: 142/76   BP Location: Right arm   Patient Position: Sitting   Cuff Size: Adult   Pulse: 78   Resp: 18   Temp: 97.1 °F (36.2 °C)   TempSrc: Infrared   SpO2: 96%   Weight: 93 kg (205 lb)   Height: 182.9 cm (72\")   PainSc: 0-No pain     Body mass index is 27.8 kg/m².     Physical Exam  Constitutional:       Appearance: Healthy appearance. Not in distress.   Neck:      Vascular: No JVR. JVD normal.   Pulmonary:      Effort: Pulmonary effort is normal.      Breath sounds: Normal breath sounds. No wheezing. No rhonchi. No rales.   Chest:      Chest wall: Not tender to palpatation.   Cardiovascular:      PMI at left midclavicular line. Normal rate. Regular rhythm. Normal S1. Normal S2.      Murmurs: There is no murmur.      No gallop. No click. No rub.   Pulses:     Intact distal pulses.   Edema:     Peripheral edema absent.   Abdominal:      General: Bowel sounds are normal.      Palpations: Abdomen is soft.      Tenderness: There is no abdominal tenderness.   Musculoskeletal: Normal range of motion.         General: No tenderness. Skin:     General: Skin is warm and dry.   Neurological:      General: No focal deficit present.      Mental Status: Alert and oriented to person, place and time.          Diagnostic Data  Procedures    Assessment and Plan  Diagnoses and all orders for this visit:    Bifascicular block-- He seems steady, HR 78,     Essential hypertension- BP is good 12.580, on Nifedipine 60 mg, Lisinopril 40 mg, HCTZ 12,5 and Lasix 20 mg          Return in about 2 months (around 3/11/2023) for " Paul.    Bhavesh Greenwood MD  01/11/2023

## 2023-01-12 ENCOUNTER — TELEPHONE (OUTPATIENT)
Dept: CARDIOLOGY | Facility: CLINIC | Age: 83
End: 2023-01-12
Payer: MEDICARE

## 2023-01-12 DIAGNOSIS — G43.109 MIGRAINE AURA OCCURRING WITH AND WITHOUT HEADACHE: Primary | ICD-10-CM

## 2023-01-12 NOTE — TELEPHONE ENCOUNTER
Patient called regarding Primadone and Nifedipine. His pharmacy asked if he's aware the Primadone will cause the Nifedipine to not be as active. Can you call him [phone]

## 2023-01-13 NOTE — TELEPHONE ENCOUNTER
Spoke with Mr. Khan and advised that Dr. Greenwood is aware that Primadone can cause the Nifedipine to be less active, however not many more BP meds he can use.  He would like you to continue to take and monitor your BP and if we see it getting out of control, he will changed to hydralazine at that point.  Patient verbalized understanding.

## 2023-01-20 ENCOUNTER — OFFICE VISIT (OUTPATIENT)
Dept: FAMILY MEDICINE CLINIC | Facility: CLINIC | Age: 83
End: 2023-01-20
Payer: MEDICARE

## 2023-01-20 VITALS
DIASTOLIC BLOOD PRESSURE: 88 MMHG | HEIGHT: 72 IN | OXYGEN SATURATION: 98 % | HEART RATE: 71 BPM | SYSTOLIC BLOOD PRESSURE: 180 MMHG | WEIGHT: 205 LBS | BODY MASS INDEX: 27.77 KG/M2

## 2023-01-20 DIAGNOSIS — I10 BENIGN ESSENTIAL HTN: ICD-10-CM

## 2023-01-20 DIAGNOSIS — R42 DIZZINESS: Primary | ICD-10-CM

## 2023-01-20 PROCEDURE — 99214 OFFICE O/P EST MOD 30 MIN: CPT | Performed by: FAMILY MEDICINE

## 2023-01-20 RX ORDER — IBUPROFEN 800 MG/1
800 TABLET ORAL EVERY 8 HOURS PRN
Qty: 90 TABLET | Refills: 2 | Status: SHIPPED | OUTPATIENT
Start: 2023-01-20

## 2023-01-24 NOTE — PROGRESS NOTES
Follow Up Office Visit      Date of Visit:  2023   Patient Name: Bryan Khan  : 1940   MRN: [id number]     Chief Complaint:    Chief Complaint   Patient presents with   • Follow-up       History of Present Illness: Bryan Khan is a 82 y.o. male who is here today for follow up.  Patient coming in to follow-up on hypertension and dizziness.  Has been evaluated by cardiology.  Blood pressure elevated today but has been doing very well at home and was good at cardiology visits.  Dizziness is the other issue.  Patient has still been unsteady.  Evaluation through 1 neurology group was fairly unrevealing.  Patient requesting evaluation through different neurologist.  Wanted to see a doctor instead of a nurse practitioner        Subjective      Review of Systems:   Review of Systems   Constitutional: Negative for fatigue and fever.   HENT: Negative for congestion and ear pain.    Respiratory: Negative for apnea, cough, chest tightness and shortness of breath.    Cardiovascular: Negative for chest pain.   Gastrointestinal: Negative for abdominal pain, constipation, diarrhea and nausea.   Musculoskeletal: Negative for arthralgias.   Psychiatric/Behavioral: Negative for depressed mood and stress.       Past Medical History:   Past Medical History:   Diagnosis Date   • Arthritis    • Cancer (HCC)    • Hypertension    • Migraine        Past Surgical History:   Past Surgical History:   Procedure Laterality Date   • APPENDECTOMY         Family History:   Family History   Problem Relation Age of Onset   • Cancer Mother    • Alzheimer's disease Mother    • Heart disease Father    • Hypertension Father    • Heart disease Brother    • Hypertension Brother    • Dementia Maternal Grandmother        Social History:   Social History     Socioeconomic History   • Marital status:    Tobacco Use   • Smoking status: Former     Packs/day: 0.50     Years: 10.00     Pack years: 5.00     Types: Cigarettes     Start  "date: 1957     Quit date: 1967     Years since quittin.0   • Smokeless tobacco: Never   Vaping Use   • Vaping Use: Never used   Substance and Sexual Activity   • Alcohol use: Yes     Alcohol/week: 2.0 standard drinks     Types: 2 Glasses of wine per week   • Drug use: No   • Sexual activity: Defer       Medications:     Current Outpatient Medications:   •  hydroCHLOROthiazide (MICROZIDE) 12.5 MG capsule, Take 1 capsule by mouth Every Morning., Disp: 90 capsule, Rfl: 1  •  lisinopril (PRINIVIL,ZESTRIL) 40 MG tablet, , Disp: , Rfl:   •  NIFEdipine XL (ADALAT CC) 60 MG 24 hr tablet, Take 1 tablet by mouth Daily., Disp: 90 tablet, Rfl: 1  •  primidone (MYSOLINE) 50 MG tablet, TAKE 1/2 TABLET BY MOUTH ONCE NIGHTLY FOR ONE WEEK THEN TAKE ONE TABLET BY MOUTH ONCE NIGHTLY, Disp: 90 tablet, Rfl: 3  •  tamsulosin (FLOMAX) 0.4 MG capsule 24 hr capsule, tamsulosin 0.4 mg capsule  Take 1 capsule every day by oral route., Disp: , Rfl:   •  ibuprofen (ADVIL,MOTRIN) 800 MG tablet, Take 1 tablet by mouth Every 8 (Eight) Hours As Needed for Mild Pain., Disp: 90 tablet, Rfl: 2    Allergies:   No Known Allergies    Objective     Physical Exam:  Vital Signs:   Vitals:    23 1351   BP: 180/88   Pulse: 71   SpO2: 98%   Weight: 93 kg (205 lb)   Height: 182.9 cm (72\")     Body mass index is 27.8 kg/m².     Physical Exam  Vitals and nursing note reviewed.   Constitutional:       General: He is not in acute distress.     Appearance: Normal appearance. He is not ill-appearing.   HENT:      Head: Normocephalic and atraumatic.      Right Ear: Tympanic membrane and ear canal normal.      Left Ear: Tympanic membrane and ear canal normal.      Nose: Nose normal.   Cardiovascular:      Rate and Rhythm: Normal rate and regular rhythm.      Heart sounds: Normal heart sounds.   Pulmonary:      Effort: Pulmonary effort is normal.      Breath sounds: Normal breath sounds.   Neurological:      Mental Status: He is alert and oriented to " person, place, and time. Mental status is at baseline.   Psychiatric:         Mood and Affect: Mood normal.         Procedures      Assessment / Plan      Assessment/Plan:   Diagnoses and all orders for this visit:    1. Dizziness (Primary)  -     Ambulatory Referral to Neurology    2. Benign essential HTN    Other orders  -     ibuprofen (ADVIL,MOTRIN) 800 MG tablet; Take 1 tablet by mouth Every 8 (Eight) Hours As Needed for Mild Pain.  Dispense: 90 tablet; Refill: 2         Blood pressure is elevated today but many readings at cardiology and home have been very stable.  We will continue with current dosage of medication for hypertension.  Doubtful this has anything to do with his dizziness.  Neurology will be called for further evaluation.    Follow Up:   No follow-ups on file.    Bryan Degroot  [facility] [place]

## 2023-02-06 ENCOUNTER — OFFICE VISIT (OUTPATIENT)
Dept: NEUROLOGY | Facility: CLINIC | Age: 83
End: 2023-02-06
Payer: MEDICARE

## 2023-02-06 VITALS
DIASTOLIC BLOOD PRESSURE: 80 MMHG | HEART RATE: 76 BPM | WEIGHT: 207 LBS | BODY MASS INDEX: 28.04 KG/M2 | OXYGEN SATURATION: 96 % | SYSTOLIC BLOOD PRESSURE: 160 MMHG | HEIGHT: 72 IN

## 2023-02-06 DIAGNOSIS — G43.109 MIGRAINE AURA WITHOUT HEADACHE: ICD-10-CM

## 2023-02-06 DIAGNOSIS — R25.1 TREMOR: Primary | ICD-10-CM

## 2023-02-06 PROBLEM — R47.89 EPISODE OF CHANGE IN SPEECH: Status: RESOLVED | Noted: 2018-06-19 | Resolved: 2023-02-06

## 2023-02-06 PROCEDURE — 99214 OFFICE O/P EST MOD 30 MIN: CPT | Performed by: PSYCHIATRY & NEUROLOGY

## 2023-02-06 NOTE — PROGRESS NOTES
"Chief Complaint    Dizziness (FOLLOW-UP )    Subjective        Bryan Khan presents to [facility] NEUROLOGY [facility]     History of Present Illness    82 y.o. male returns in follow up.  Last visit on 8/2/22 continued primidone.      Migraines tx with PHT, PHB in 20's.     Visual aura for 20 minutes.  Sometimes followed by mild headache.  Lethargy.  Once a year.      Right hand tremor with holding a glass.  Tremor worsening handwriting.  Gait feels unsteady.       Objective   Vital Signs:  /80 (BP Location: Right arm, Patient Position: Sitting, Cuff Size: Adult)   Pulse 76   Ht 182.9 cm (72.01\")   Wt 93.9 kg (207 lb)   SpO2 96% Comment: resting room air  BMI 28.07 kg/m²   Estimated body mass index is 28.07 kg/m² as calculated from the following:    Height as of this encounter: 182.9 cm (72.01\").    Weight as of this encounter: 93.9 kg (207 lb).             Physical Exam  Eyes:      Extraocular Movements: EOM normal.      Pupils: Pupils are equal, round, and reactive to light.   Neurological:      Mental Status: He is oriented to person, place, and time.      Cranial Nerves: Cranial nerves 2-12 are intact.      Motor: Motor strength is normal.      Gait: Gait is intact.   Psychiatric:         Speech: Speech normal.          Neurologic Exam     Mental Status   Oriented to person, place, and time.   Speech: speech is normal   Level of consciousness: alert  Knowledge: good and consistent with education.   Normal comprehension.     Cranial Nerves   Cranial nerves II through XII intact.     CN II   Visual fields full to confrontation.   Visual acuity: normal  Right visual field deficit: none  Left visual field deficit: none     CN III, IV, VI   Pupils are equal, round, and reactive to light.  Extraocular motions are normal.   Nystagmus: none   Diplopia: none  Ophthalmoparesis: none  Upgaze: normal  Downgaze: normal  Conjugate gaze: present    CN V   Facial sensation intact.   Right corneal " reflex: normal  Left corneal reflex: normal    CN VII   Right facial weakness: none  Left facial weakness: none    CN VIII   Hearing: intact    CN IX, X   Palate: symmetric  Right gag reflex: normal  Left gag reflex: normal    CN XI   Right sternocleidomastoid strength: normal  Left sternocleidomastoid strength: normal    CN XII   Tongue: not atrophic  Fasciculations: absent  Tongue deviation: none    Motor Exam   Muscle bulk: normal  Overall muscle tone: normal    Strength   Strength 5/5 throughout.     Sensory Exam   Light touch normal.     Gait, Coordination, and Reflexes     Gait  Gait: normal    Tremor   Resting tremor: absent  Intention tremor: absent  Action tremor: right arm and left arm    Reflexes   Reflexes 2+ except as noted. R > L tremor at endpt       Result Review :  The following data was reviewed by: Ayad Zambrano MD on 02/06/2023:  Common labs    Common Labs 8/16/22 8/16/22 8/16/22 9/29/22    1206 1206 1206    Glucose  91     BUN  13     Creatinine  0.82     Sodium  135     Potassium  4.9     Chloride  95 (A)     Calcium  9.1     Total Protein  6.4     Albumin  4.4     Total Bilirubin  0.5     Alkaline Phosphatase  102     AST (SGOT)  21     ALT (SGPT)  18     WBC 6.1      Hemoglobin 14.2      Hematocrit 40.5      Platelets 234      Total Cholesterol   156    Triglycerides   137    HDL Cholesterol   34 (A)    LDL Cholesterol    97    PSA    8.3 (A)   (A) Abnormal value       Comments are available for some flowsheets but are not being displayed.                        Assessment and Plan   Diagnoses and all orders for this visit:    1. 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  Continue current treatment regimen.                 Follow Up   No follow-ups on file.  Patient was given instructions and counseling regarding his condition or for health maintenance advice. Please see specific  information pulled into the AVS if appropriate.

## 2023-02-27 ENCOUNTER — OFFICE VISIT (OUTPATIENT)
Dept: FAMILY MEDICINE CLINIC | Facility: CLINIC | Age: 83
End: 2023-02-27
Payer: MEDICARE

## 2023-02-27 VITALS
HEART RATE: 71 BPM | SYSTOLIC BLOOD PRESSURE: 158 MMHG | BODY MASS INDEX: 27.93 KG/M2 | WEIGHT: 206.2 LBS | OXYGEN SATURATION: 96 % | HEIGHT: 72 IN | DIASTOLIC BLOOD PRESSURE: 80 MMHG

## 2023-02-27 DIAGNOSIS — M54.2 CERVICALGIA: ICD-10-CM

## 2023-02-27 DIAGNOSIS — Z00.00 ROUTINE GENERAL MEDICAL EXAMINATION AT A HEALTH CARE FACILITY: ICD-10-CM

## 2023-02-27 DIAGNOSIS — Z12.11 COLON CANCER SCREENING: ICD-10-CM

## 2023-02-27 DIAGNOSIS — I10 ESSENTIAL HYPERTENSION: ICD-10-CM

## 2023-02-27 DIAGNOSIS — Z00.00 MEDICARE ANNUAL WELLNESS VISIT, INITIAL: Primary | ICD-10-CM

## 2023-02-27 PROCEDURE — 1170F FXNL STATUS ASSESSED: CPT | Performed by: FAMILY MEDICINE

## 2023-02-27 PROCEDURE — 1159F MED LIST DOCD IN RCRD: CPT | Performed by: FAMILY MEDICINE

## 2023-02-27 PROCEDURE — G0439 PPPS, SUBSEQ VISIT: HCPCS | Performed by: FAMILY MEDICINE

## 2023-02-27 PROCEDURE — 36415 COLL VENOUS BLD VENIPUNCTURE: CPT | Performed by: FAMILY MEDICINE

## 2023-02-27 RX ORDER — NIFEDIPINE 60 MG/1
60 TABLET, FILM COATED, EXTENDED RELEASE ORAL DAILY
Qty: 90 TABLET | Refills: 1 | Status: SHIPPED | OUTPATIENT
Start: 2023-02-27 | End: 2023-03-24 | Stop reason: DRUGHIGH

## 2023-03-01 LAB
ALBUMIN SERPL-MCNC: 4.7 G/DL (ref 3.6–4.6)
ALBUMIN/GLOB SERPL: 2 {RATIO} (ref 1.2–2.2)
ALP SERPL-CCNC: 89 IU/L (ref 44–121)
ALT SERPL-CCNC: 26 IU/L (ref 0–44)
AST SERPL-CCNC: 21 IU/L (ref 0–40)
BASOPHILS # BLD AUTO: 0 X10E3/UL (ref 0–0.2)
BASOPHILS NFR BLD AUTO: 0 %
BILIRUB SERPL-MCNC: 0.5 MG/DL (ref 0–1.2)
BUN SERPL-MCNC: 16 MG/DL (ref 8–27)
BUN/CREAT SERPL: 18 (ref 10–24)
CALCIUM SERPL-MCNC: 9.1 MG/DL (ref 8.6–10.2)
CHLORIDE SERPL-SCNC: 97 MMOL/L (ref 96–106)
CHOLEST SERPL-MCNC: 173 MG/DL (ref 100–199)
CO2 SERPL-SCNC: 25 MMOL/L (ref 20–29)
CREAT SERPL-MCNC: 0.9 MG/DL (ref 0.76–1.27)
EGFRCR SERPLBLD CKD-EPI 2021: 86 ML/MIN/1.73
EOSINOPHIL # BLD AUTO: 0.1 X10E3/UL (ref 0–0.4)
EOSINOPHIL NFR BLD AUTO: 2 %
ERYTHROCYTE [DISTWIDTH] IN BLOOD BY AUTOMATED COUNT: 13 % (ref 11.6–15.4)
GLOBULIN SER CALC-MCNC: 2.3 G/DL (ref 1.5–4.5)
GLUCOSE SERPL-MCNC: 100 MG/DL (ref 70–99)
HCT VFR BLD AUTO: 45 % (ref 37.5–51)
HDLC SERPL-MCNC: 48 MG/DL
HGB BLD-MCNC: 15.6 G/DL (ref 13–17.7)
IMM GRANULOCYTES # BLD AUTO: 0.1 X10E3/UL (ref 0–0.1)
IMM GRANULOCYTES NFR BLD AUTO: 1 %
LDLC SERPL CALC-MCNC: 108 MG/DL (ref 0–99)
LYMPHOCYTES # BLD AUTO: 2.4 X10E3/UL (ref 0.7–3.1)
LYMPHOCYTES NFR BLD AUTO: 34 %
MCH RBC QN AUTO: 31.5 PG (ref 26.6–33)
MCHC RBC AUTO-ENTMCNC: 34.7 G/DL (ref 31.5–35.7)
MCV RBC AUTO: 91 FL (ref 79–97)
MONOCYTES # BLD AUTO: 0.6 X10E3/UL (ref 0.1–0.9)
MONOCYTES NFR BLD AUTO: 8 %
NEUTROPHILS # BLD AUTO: 3.9 X10E3/UL (ref 1.4–7)
NEUTROPHILS NFR BLD AUTO: 55 %
PLATELET # BLD AUTO: 268 X10E3/UL (ref 150–450)
POTASSIUM SERPL-SCNC: 4.3 MMOL/L (ref 3.5–5.2)
PROT SERPL-MCNC: 7 G/DL (ref 6–8.5)
RBC # BLD AUTO: 4.96 X10E6/UL (ref 4.14–5.8)
SODIUM SERPL-SCNC: 136 MMOL/L (ref 134–144)
TRIGL SERPL-MCNC: 93 MG/DL (ref 0–149)
TSH SERPL DL<=0.005 MIU/L-ACNC: 1.2 UIU/ML (ref 0.45–4.5)
VLDLC SERPL CALC-MCNC: 17 MG/DL (ref 5–40)
WBC # BLD AUTO: 7.1 X10E3/UL (ref 3.4–10.8)

## 2023-03-02 ENCOUNTER — TELEPHONE (OUTPATIENT)
Dept: FAMILY MEDICINE CLINIC | Facility: CLINIC | Age: 83
End: 2023-03-02
Payer: MEDICARE

## 2023-03-02 NOTE — TELEPHONE ENCOUNTER
Caller: Bryan Khan    Relationship: Self    Best call back number: [phone]    Caller requesting test results: YES    What test was performed: LABS AND X-RAY     When was the test performed: 2.27.23    Where was the test performed: IN OFFICE    Additional notes: PLEASE CALL TO DISCUSS THESE RESULTS AS SOON AS POSSIBLE.    THANK YOU.

## 2023-03-05 NOTE — TELEPHONE ENCOUNTER
Labs show that overall kidney function and liver function are normal.  Sugar is stable.  Sodium potassium calcium and chloride all normal.  Blood counts are also normal.  Cholesterol shows his good cholesterol to be good.  His LDL or bad cholesterol is just barely above the normal range.  Would not suggest medication at this point.  Triglycerides are fine.  No issues of any great concern on his blood work.    X-rays show arthritis and some wear-and-tear in his spine.  He has some mild to moderate arthritis changes throughout.  Little bit worse at C6-C7.  Options for treatment would include continuing his ibuprofen, proceeding forward with the physical therapy that we discussed.  I think we actually have an order in for that.  If that did not work, we could also place a referral to Ortho for further evaluation.  I would definitely try the physical therapy first.

## 2023-03-06 NOTE — TELEPHONE ENCOUNTER
Spoke with patient and informed him that we cannot give results until the provider reviews the results. Patient declines PT or Ortho at this time - I advised patient to call us back if he changes his mind. Patient wants copies of his lab results so I will mail these today.

## 2023-03-06 NOTE — TELEPHONE ENCOUNTER
EVEN THOUGH IT DOES NOT SAY HUB TO READ, I CHOSE TO READ THE RESULTS  TRIED TO WARM TRANSFER - NO ANSWER  PATIENT HAS BEEN TRYING FOR FIVE DAYS TO GET RESULTS    PATIENT VERBALIZED UNDERSTANDING OF RESULTS BUT ALSO EXPRESSED CONCERN AS TO WHY IT TOOK FIVE DAYS TO GET RESULTS.  UNHAPPY WITH [facility].  TRIED TO EXPLAIN THAT [facility] IS TRYING TO RELIEVE SOME OF THE WORK OFF THE OFFICE SO THE OFFICE CAN FOCUS MORE ON THE PATIENT.  PATIENT WAS NOT HAPPY

## 2023-03-24 ENCOUNTER — OFFICE VISIT (OUTPATIENT)
Dept: CARDIOLOGY | Facility: CLINIC | Age: 83
End: 2023-03-24
Payer: MEDICARE

## 2023-03-24 VITALS
SYSTOLIC BLOOD PRESSURE: 138 MMHG | WEIGHT: 208 LBS | RESPIRATION RATE: 18 BRPM | HEART RATE: 69 BPM | BODY MASS INDEX: 28.17 KG/M2 | TEMPERATURE: 97.3 F | HEIGHT: 72 IN | OXYGEN SATURATION: 97 % | DIASTOLIC BLOOD PRESSURE: 68 MMHG

## 2023-03-24 DIAGNOSIS — E78.00 HYPERCHOLESTEROLEMIA: ICD-10-CM

## 2023-03-24 DIAGNOSIS — G45.9 TIA (TRANSIENT ISCHEMIC ATTACK): ICD-10-CM

## 2023-03-24 DIAGNOSIS — R09.89 LEFT CAROTID BRUIT: ICD-10-CM

## 2023-03-24 DIAGNOSIS — I10 ESSENTIAL HYPERTENSION: ICD-10-CM

## 2023-03-24 DIAGNOSIS — I45.2 BIFASCICULAR BLOCK: Primary | ICD-10-CM

## 2023-03-24 PROCEDURE — 1160F RVW MEDS BY RX/DR IN RCRD: CPT | Performed by: INTERNAL MEDICINE

## 2023-03-24 PROCEDURE — 1159F MED LIST DOCD IN RCRD: CPT | Performed by: INTERNAL MEDICINE

## 2023-03-24 PROCEDURE — 3078F DIAST BP <80 MM HG: CPT | Performed by: INTERNAL MEDICINE

## 2023-03-24 PROCEDURE — 99214 OFFICE O/P EST MOD 30 MIN: CPT | Performed by: INTERNAL MEDICINE

## 2023-03-24 PROCEDURE — 3075F SYST BP GE 130 - 139MM HG: CPT | Performed by: INTERNAL MEDICINE

## 2023-03-24 RX ORDER — HYDROCHLOROTHIAZIDE 12.5 MG/1
12.5 CAPSULE, GELATIN COATED ORAL EVERY MORNING
Qty: 90 CAPSULE | Refills: 3 | Status: SHIPPED | OUTPATIENT
Start: 2023-03-24

## 2023-03-24 RX ORDER — NIFEDIPINE 90 MG/1
90 TABLET, EXTENDED RELEASE ORAL DAILY
Qty: 90 TABLET | Refills: 2 | Status: SHIPPED | OUTPATIENT
Start: 2023-03-24

## 2023-03-24 RX ORDER — CANDESARTAN 32 MG/1
32 TABLET ORAL DAILY
Qty: 90 TABLET | Refills: 3 | Status: SHIPPED | OUTPATIENT
Start: 2023-03-24

## 2023-03-24 RX ORDER — ASPIRIN 81 MG/1
81 TABLET, CHEWABLE ORAL DAILY
COMMUNITY

## 2023-03-24 NOTE — PROGRESS NOTES
[facility] [place]  [facility] CARDIOLOGY  [address] [place] [address]  Dept: [phone]  Dept Fax: [phone]    Bryan Khan  1940    Follow Up Office Visit Note    History of Present Illness:  Bryan Khan is a 82 y.o. male who presents to the clinic for Follow-up. Hypertension- His BP is mildly elevated 145.80, on Nifedipine 60 mg, Lisinopril 40 mg, Hctz12,5 ,will increase Nifedipine to 90 mg and will use Candesartan instead Lisinopril 32 mg,     The following portions of the patient's history were reviewed and updated as appropriate: allergies, current medications, past family history, past medical history, past social history, past surgical history, and problem list.    Medications:  aspirin  candesartan  hydroCHLOROthiazide  ibuprofen  NIFEdipine XL  primidone  tamsulosin capsule    Subjective  No Known Allergies     Past Medical History:   Diagnosis Date   • Arthritis    • Cancer (HCC)    • HL (hearing loss)  + or -   • Hypercholesterolemia 3/24/2023   • Hypertension    • Migraine    • Shingles  or    • TIA (transient ischemic attack) 3/24/2023   • Vision loss        Past Surgical History:   Procedure Laterality Date   • APPENDECTOMY         Family History   Problem Relation Age of Onset   • Cancer Mother    • Alzheimer's disease Mother    • Heart disease Father    • Hypertension Father    • Heart disease Brother    • Hypertension Brother    • Dementia Maternal Grandmother         Social History     Socioeconomic History   • Marital status:    Tobacco Use   • Smoking status: Former     Packs/day: 0.50     Years: 10.00     Pack years: 5.00     Types: Cigarettes     Start date: 1957     Quit date: 1967     Years since quittin.2   • Smokeless tobacco: Never   Vaping Use   • Vaping Use: Never used   Substance and Sexual Activity   • Alcohol use: Yes     Alcohol/week: 2.0 standard drinks     Types: 2 Glasses of wine per week   • Drug use:  "No   • Sexual activity: Yes     Partners: Female       Review of Systems   Constitutional: Negative.    HENT: Negative.    Respiratory: Negative.    Cardiovascular: Negative.    Endocrine: Negative.    Genitourinary: Negative.    Musculoskeletal: Negative.    Skin: Negative.    Allergic/Immunologic: Negative.    Neurological: Negative.    Hematological: Negative.    Psychiatric/Behavioral: Negative.        Cardiovascular Procedures    ECHO/MUGA:  STRESS TESTS:   CARDIAC CATH:   DEVICES:   HOLTER:   CT/MRI:   VASCULAR:   CARDIOTHORACIC:     Objective  Vitals:    03/24/23 1453   BP: 138/68   BP Location: Right arm   Patient Position: Sitting   Cuff Size: Adult   Pulse: 69   Resp: 18   Temp: 97.3 °F (36.3 °C)   TempSrc: Infrared   SpO2: 97%   Weight: 94.3 kg (208 lb)   Height: 182.9 cm (72\")   PainSc: 0-No pain     Body mass index is 28.21 kg/m².     Physical Exam  Constitutional:       Appearance: Healthy appearance. Not in distress.   Neck:      Vascular: No JVR. JVD normal.   Pulmonary:      Effort: Pulmonary effort is normal.      Breath sounds: Normal breath sounds. No wheezing. No rhonchi. No rales.   Chest:      Chest wall: Not tender to palpatation.   Cardiovascular:      PMI at left midclavicular line. Normal rate. Regular rhythm. Normal S1. Normal S2.      Murmurs: There is no murmur.      No gallop. No click. No rub.   Pulses:     Intact distal pulses.   Edema:     Peripheral edema absent.   Abdominal:      General: Bowel sounds are normal.      Palpations: Abdomen is soft.      Tenderness: There is no abdominal tenderness.   Musculoskeletal: Normal range of motion.         General: No tenderness. Skin:     General: Skin is warm and dry.   Neurological:      General: No focal deficit present.      Mental Status: Alert and oriented to person, place and time.          Diagnostic Data  Procedures    Assessment and Plan  Diagnoses and all orders for this visit:    Hypertension Will try to improve BP control, " "will use Candesartan 32 mg, keep nifedipine but higher dose 90 mg, plus HCTZ 12,5 Breastfeeding Status Controls Medication Warnings in Women of All Ages.  A Status of \"Unknown\" is Treated as \"Yes\" For These Warnings.  In Order to Prevent Inaccurate Warnings, Breastfeeding Status Should Be Updated to \"Yes\" or \"No\" is 145.80    Bifascicular block- LASHB and also First degree AV block- Hr is stable 69, will keep same meds    Hypercholesterolemia-He is not willing to take any meds yet    TIA (transient ischemic attack)- Last month has some numbness and tingling on the left hand, will get a carotid doppler  -     Duplex Carotid Ultrasound CAR; Future    right carotid bruit  -     Duplex Carotid Ultrasound CAR; Future    Other orders  -     aspirin 81 MG chewable tablet; Chew 1 tablet Daily.  -     candesartan (ATACAND) 32 MG tablet; Take 1 tablet by mouth Daily.  -     NIFEdipine XL (PROCARDIA XL) 90 MG 24 hr tablet; Take 1 tablet by mouth Daily.  -     hydroCHLOROthiazide (MICROZIDE) 12.5 MG capsule; Take 1 capsule by mouth Every Morning.         Return in about 3 months (around 6/24/2023) for Recheck with Dr. Greenwood.    Bhavesh Greenwood MD  03/24/2023  "

## 2023-03-31 ENCOUNTER — TELEPHONE (OUTPATIENT)
Dept: CARDIOLOGY | Facility: CLINIC | Age: 83
End: 2023-03-31
Payer: MEDICARE

## 2023-03-31 NOTE — TELEPHONE ENCOUNTER
----- Message from Bhavesh Greenwood MD sent at 3/30/2023  9:41 PM EDT -----  Minor plaques in the carotid, please he needs to see PCP, he was having some numbness on his arm

## 2023-03-31 NOTE — TELEPHONE ENCOUNTER
Spoke with  Bill and went over recent carotid doppler.  You have minor plaques in both sides of your neck.  They are <40% stenosed.  You were having numbness in your hand so please f/u with Dr. Degroot.  He states he has already discussed with him.

## 2023-09-12 ENCOUNTER — OFFICE VISIT (OUTPATIENT)
Dept: FAMILY MEDICINE CLINIC | Facility: CLINIC | Age: 83
End: 2023-09-12
Payer: MEDICARE

## 2023-09-12 VITALS
BODY MASS INDEX: 28.23 KG/M2 | WEIGHT: 208.44 LBS | OXYGEN SATURATION: 99 % | SYSTOLIC BLOOD PRESSURE: 148 MMHG | HEART RATE: 69 BPM | DIASTOLIC BLOOD PRESSURE: 70 MMHG | HEIGHT: 72 IN

## 2023-09-12 DIAGNOSIS — I10 ESSENTIAL HYPERTENSION: Primary | ICD-10-CM

## 2023-09-12 DIAGNOSIS — Z00.00 ROUTINE GENERAL MEDICAL EXAMINATION AT A HEALTH CARE FACILITY: ICD-10-CM

## 2023-09-12 RX ORDER — FLUOROMETHOLONE 0.1 %
SUSPENSION, DROPS(FINAL DOSAGE FORM)(ML) OPHTHALMIC (EYE)
COMMUNITY
Start: 2023-08-17

## 2023-09-13 NOTE — PROGRESS NOTES
Follow Up Office Visit      Date of Visit:  2023   Patient Name: Bryan Khan  : 1940   MRN: [id number]     Chief Complaint:    Chief Complaint   Patient presents with    Hypertension    Hyperlipidemia       History of Present Illness: Bryan Khan is a 82 y.o. male who is here today for follow up.  Seen today in a checkup regarding his hypertension and hyperlipidemia.  Overall conditions have remained relatively stable on current medications.  He had some questions in regards to his nifedipine.  It is an extended release pill and he has seen it pass to his bowels and he was curious to see if it was actually doing anything or not.  Review visits the cardiology and his neurologist.        Subjective      Review of Systems:   Review of Systems   Constitutional:  Negative for fatigue and fever.   HENT:  Negative for congestion and ear pain.    Respiratory:  Negative for apnea, cough, chest tightness and shortness of breath.    Cardiovascular:  Negative for chest pain.   Gastrointestinal:  Negative for abdominal pain, constipation, diarrhea and nausea.   Musculoskeletal:  Negative for arthralgias.   Psychiatric/Behavioral:  Negative for depressed mood and stress.      Past Medical History:   Past Medical History:   Diagnosis Date    Arthritis     Cancer     HL (hearing loss)  + or -    Hypercholesterolemia 3/24/2023    Hypertension     Migraine     Shingles  or     TIA (transient ischemic attack) 3/24/2023    Vision loss        Past Surgical History:   Past Surgical History:   Procedure Laterality Date    APPENDECTOMY         Family History:   Family History   Problem Relation Age of Onset    Cancer Mother     Alzheimer's disease Mother     Heart disease Father     Hypertension Father     Heart disease Brother     Hypertension Brother     Dementia Maternal Grandmother        Social History:   Social History     Socioeconomic History    Marital status:    Tobacco Use    Smoking  "status: Former     Packs/day: 0.50     Years: 10.00     Pack years: 5.00     Types: Cigarettes     Start date: 1957     Quit date: 1967     Years since quittin.7    Smokeless tobacco: Never   Vaping Use    Vaping Use: Never used   Substance and Sexual Activity    Alcohol use: Yes     Alcohol/week: 2.0 standard drinks     Types: 2 Glasses of wine per week    Drug use: No    Sexual activity: Yes     Partners: Female       Medications:     Current Outpatient Medications:     acetaminophen (TYLENOL) 500 MG tablet, Take 1 tablet by mouth Every 6 (Six) Hours As Needed for Mild Pain., Disp: 30 tablet, Rfl: 6    candesartan (ATACAND) 32 MG tablet, Take 1 tablet by mouth Daily., Disp: 90 tablet, Rfl: 3    fluorometholone (FML) 0.1 % ophthalmic suspension, , Disp: , Rfl:     hydroCHLOROthiazide (MICROZIDE) 12.5 MG capsule, Take 1 capsule by mouth Every Morning., Disp: 90 capsule, Rfl: 3    ibuprofen (ADVIL,MOTRIN) 800 MG tablet, Take 1 tablet by mouth Every 8 (Eight) Hours As Needed for Mild Pain., Disp: 90 tablet, Rfl: 2    NIFEdipine XL (PROCARDIA XL) 90 MG 24 hr tablet, Take 1 tablet by mouth Daily., Disp: 90 tablet, Rfl: 2    primidone (MYSOLINE) 50 MG tablet, Take 1 tablet by mouth Every Night., Disp: 90 tablet, Rfl: 3    tamsulosin (FLOMAX) 0.4 MG capsule 24 hr capsule, tamsulosin 0.4 mg capsule  Take 1 capsule every day by oral route., Disp: , Rfl:     Allergies:   No Known Allergies    Objective     Physical Exam:  Vital Signs:   Vitals:    23 1344   BP: 148/70   Pulse: 69   SpO2: 99%   Weight: 94.5 kg (208 lb 7 oz)   Height: 182.9 cm (72\")     Body mass index is 28.27 kg/m².     Physical Exam  Vitals and nursing note reviewed.   Constitutional:       General: He is not in acute distress.     Appearance: Normal appearance. He is not ill-appearing.   HENT:      Head: Normocephalic and atraumatic.      Right Ear: Tympanic membrane and ear canal normal.      Left Ear: Tympanic membrane and ear canal " normal.      Nose: Nose normal.   Cardiovascular:      Rate and Rhythm: Normal rate and regular rhythm.      Heart sounds: Normal heart sounds.   Pulmonary:      Effort: Pulmonary effort is normal.      Breath sounds: Normal breath sounds.   Neurological:      Mental Status: He is alert and oriented to person, place, and time. Mental status is at baseline.   Psychiatric:         Mood and Affect: Mood normal.       Procedures      Assessment / Plan      Assessment/Plan:   Diagnoses and all orders for this visit:    1. Essential hypertension (Primary)  -     CBC Auto Differential; Future  -     Comprehensive Metabolic Panel; Future  -     Lipid Panel; Future  -     TSH; Future    2. Routine general medical examination at a health care facility  -     CBC Auto Differential; Future  -     Comprehensive Metabolic Panel; Future  -     Lipid Panel; Future  -     TSH; Future         We did put in lab orders to be done later for evaluation of his hyperlipidemia.  No changes in his other chronic medications at this point.  Will refill as needed.    Follow Up:   No follow-ups on file.    Bryan Degroot  [facility]

## 2023-11-10 ENCOUNTER — TELEPHONE (OUTPATIENT)
Dept: CARDIOLOGY | Facility: CLINIC | Age: 83
End: 2023-11-10
Payer: MEDICARE

## 2023-12-19 ENCOUNTER — OFFICE VISIT (OUTPATIENT)
Dept: CARDIOLOGY | Facility: CLINIC | Age: 83
End: 2023-12-19
Payer: MEDICARE

## 2023-12-19 VITALS
TEMPERATURE: 97 F | RESPIRATION RATE: 18 BRPM | HEIGHT: 72 IN | DIASTOLIC BLOOD PRESSURE: 80 MMHG | HEART RATE: 78 BPM | OXYGEN SATURATION: 99 % | BODY MASS INDEX: 28.17 KG/M2 | WEIGHT: 208 LBS | SYSTOLIC BLOOD PRESSURE: 144 MMHG

## 2023-12-19 DIAGNOSIS — I10 ESSENTIAL HYPERTENSION: Primary | ICD-10-CM

## 2023-12-19 DIAGNOSIS — I45.2 BIFASCICULAR BLOCK: ICD-10-CM

## 2023-12-19 DIAGNOSIS — E78.00 HYPERCHOLESTEROLEMIA: ICD-10-CM

## 2023-12-19 PROCEDURE — 3077F SYST BP >= 140 MM HG: CPT | Performed by: INTERNAL MEDICINE

## 2023-12-19 PROCEDURE — 1159F MED LIST DOCD IN RCRD: CPT | Performed by: INTERNAL MEDICINE

## 2023-12-19 PROCEDURE — 1160F RVW MEDS BY RX/DR IN RCRD: CPT | Performed by: INTERNAL MEDICINE

## 2023-12-19 PROCEDURE — 3079F DIAST BP 80-89 MM HG: CPT | Performed by: INTERNAL MEDICINE

## 2023-12-19 NOTE — PROGRESS NOTES
[facility] [place]  [facility] CARDIOLOGY  [address] [place] [address]  Dept: [phone]  Dept Fax: [phone]    Bryan Khan  1940    Follow Up Office Visit Note    History of Present Illness:  Bryan Khan is a 83 y.o. male who presents to the clinic for Follow-up.Hypertension- The BP seems good  130.80 on Candesartan 32 mg and Nifedipine 90 mg plus HCTZ 12,5  denies any major complaints except has some tremor and unsteadiness, has seen a neurologist in the past, and he would like to see someone else,he also has migraines    The following portions of the patient's history were reviewed and updated as appropriate: allergies, current medications, past family history, past medical history, past social history, past surgical history, and problem list.    Medications:  acetaminophen  candesartan  fluorometholone  hydroCHLOROthiazide  ibuprofen  NIFEdipine XL  primidone  tamsulosin capsule    Subjective  No Known Allergies     Past Medical History:   Diagnosis Date   • Arthritis    • Cancer    • HL (hearing loss)  + or -   • Hypercholesterolemia 3/24/2023   • Hypertension    • Migraine    • Shingles  or    • TIA (transient ischemic attack) 3/24/2023   • Vision loss        Past Surgical History:   Procedure Laterality Date   • APPENDECTOMY         Family History   Problem Relation Age of Onset   • Cancer Mother    • Alzheimer's disease Mother    • Heart disease Father    • Hypertension Father    • Heart disease Brother    • Hypertension Brother    • Dementia Maternal Grandmother         Social History     Socioeconomic History   • Marital status:    Tobacco Use   • Smoking status: Former     Packs/day: 0.50     Years: 10.00     Additional pack years: 0.00     Total pack years: 5.00     Types: Cigarettes     Start date: 1957     Quit date: 1967     Years since quittin.0   • Smokeless tobacco: Never   Vaping Use   • Vaping Use: Never used  "  Substance and Sexual Activity   • Alcohol use: Yes     Alcohol/week: 2.0 standard drinks of alcohol     Types: 2 Glasses of wine per week   • Drug use: No   • Sexual activity: Yes     Partners: Female       Review of Systems   Constitutional: Negative.    HENT: Negative.     Respiratory: Negative.     Cardiovascular: Negative.    Endocrine: Negative.    Genitourinary: Negative.    Musculoskeletal: Negative.    Skin: Negative.    Allergic/Immunologic: Negative.    Neurological: Negative.    Hematological: Negative.    Psychiatric/Behavioral: Negative.       Cardiovascular Procedures    ECHO/MUGA:  STRESS TESTS:   CARDIAC CATH:   DEVICES:   HOLTER:   CT/MRI:   VASCULAR:   CARDIOTHORACIC:     Objective  Vitals:    12/19/23 1252   BP: 144/80   BP Location: Right arm   Patient Position: Sitting   Cuff Size: Adult   Pulse: 78   Resp: 18   Temp: 97 °F (36.1 °C)   TempSrc: Infrared   SpO2: 99%   Weight: 94.3 kg (208 lb)   Height: 182.9 cm (72\")   PainSc: 0-No pain     Body mass index is 28.21 kg/m².     Physical Exam  Vitals reviewed.   Constitutional:       Appearance: Healthy appearance. Not in distress.   Eyes:      Pupils: Pupils are equal, round, and reactive to light.   HENT:    Mouth/Throat:      Pharynx: Oropharynx is clear.   Neck:      Thyroid: Thyroid normal.      Vascular: No JVR. JVD normal.   Pulmonary:      Effort: Pulmonary effort is normal.      Breath sounds: Normal breath sounds. No wheezing. No rhonchi. No rales.   Chest:      Chest wall: Not tender to palpatation.   Cardiovascular:      PMI at left midclavicular line. Normal rate. Regular rhythm. Normal S1. Normal S2.       Murmurs: There is no murmur.      No gallop.  No click. No rub.   Pulses:     Intact distal pulses.      Carotid: 3+ bilaterally.     Radial: 3+ bilaterally.     Femoral: 3+ bilaterally.     Dorsalis pedis: 3+ bilaterally.     Posterior tibial: 3+ bilaterally.  Edema:     Peripheral edema absent.   Abdominal:      General: Bowel " sounds are normal.      Palpations: Abdomen is soft.      Tenderness: There is no abdominal tenderness.   Musculoskeletal: Normal range of motion.         General: No tenderness.      Cervical back: Normal range of motion and neck supple. Skin:     General: Skin is warm and dry.   Neurological:      General: No focal deficit present.      Mental Status: Alert and oriented to person, place and time.        Diagnostic Data    ECG 12 Lead    Date/Time: 12/19/2023 4:10 PM  Performed by: Bhavesh Greenwood MD    Authorized by: Bhavesh Greenwood MD  Comparison: compared with previous ECG from 6/23/2022  Similar to previous ECG  Rhythm: sinus rhythm  Rate: normal  BPM: 67  Conduction: left anterior fascicular block and 1st degree AV block  QRS axis: left    Clinical impression: abnormal EKG      Assessment and Plan  Diagnoses and all orders for this visit:    Essential hypertension- BP is good 130.80, will keep same meds Nifedipine 90 mg, candesartan 32 mg and also HCTZ 12,5 , denies any complaints    Bifascicular block-First degree AV block and Left anterosuperior hemiblock, HR is 67, no complaints, syncope, dizziness, weakness    Hypercholesterolemia- His LDL is mildly elevated 108, he is willing to lower the lipids with diet and exertion, and he would like to get check again in few months         Return in about 6 months (around 6/19/2024) for Recheck with Dr. Greenwood.    Bhavesh Greenwood MD  12/19/2023

## 2023-12-22 RX ORDER — NIFEDIPINE 90 MG/1
90 TABLET, EXTENDED RELEASE ORAL DAILY
Qty: 90 TABLET | Refills: 2 | Status: SHIPPED | OUTPATIENT
Start: 2023-12-22

## 2023-12-27 RX ORDER — IBUPROFEN 800 MG/1
800 TABLET ORAL EVERY 8 HOURS PRN
Qty: 90 TABLET | Refills: 2 | Status: SHIPPED | OUTPATIENT
Start: 2023-12-27

## 2024-03-08 RX ORDER — CANDESARTAN 32 MG/1
32 TABLET ORAL DAILY
Qty: 90 TABLET | Refills: 3 | Status: SHIPPED | OUTPATIENT
Start: 2024-03-08

## 2024-05-15 ENCOUNTER — TELEPHONE (OUTPATIENT)
Dept: CARDIOLOGY | Facility: CLINIC | Age: 84
End: 2024-05-15

## 2024-05-15 ENCOUNTER — OFFICE VISIT (OUTPATIENT)
Dept: CARDIOLOGY | Facility: CLINIC | Age: 84
End: 2024-05-15
Payer: MEDICARE

## 2024-05-15 VITALS
SYSTOLIC BLOOD PRESSURE: 160 MMHG | DIASTOLIC BLOOD PRESSURE: 80 MMHG | BODY MASS INDEX: 27.9 KG/M2 | OXYGEN SATURATION: 99 % | WEIGHT: 206 LBS | RESPIRATION RATE: 16 BRPM | HEART RATE: 67 BPM | HEIGHT: 72 IN

## 2024-05-15 DIAGNOSIS — I45.2 BIFASCICULAR BLOCK: Primary | ICD-10-CM

## 2024-05-15 DIAGNOSIS — E78.00 HYPERCHOLESTEROLEMIA: ICD-10-CM

## 2024-05-15 DIAGNOSIS — R09.89 LEFT CAROTID BRUIT: ICD-10-CM

## 2024-05-15 DIAGNOSIS — I10 ESSENTIAL HYPERTENSION: ICD-10-CM

## 2024-05-15 DIAGNOSIS — G45.9 TIA (TRANSIENT ISCHEMIC ATTACK): ICD-10-CM

## 2024-05-15 RX ORDER — AMLODIPINE BESYLATE 10 MG/1
10 TABLET ORAL DAILY
Qty: 90 TABLET | Refills: 3 | Status: SHIPPED | OUTPATIENT
Start: 2024-05-15

## 2024-05-15 NOTE — PROGRESS NOTES
[facility] [place]  [facility] CARDIOLOGY  [address] [place] [address]  Dept: [phone]  Dept Fax: [phone]    Bryan Khan  1940    Follow Up Office Visit Note    History of Present Illness:  Bryan Khan is a 83 y.o. male who presents to the clinic for Follow-up.Hypertension- The BP is 160.80 for the last 2 months went up., on Candesartan 32 mg, Nifedipine 10 mg and also HCTZ 12,5, he states his Nifedipine goes on the stool untouched, will change for Amlodipine 10 mg, advised to let me know in 2-3 weeks if his BP is better or now, might need another meds    The following portions of the patient's history were reviewed and updated as appropriate: allergies, current medications, past family history, past medical history, past social history, past surgical history, and problem list.d    Medications:  amLODIPine  candesartan  fluorometholone  hydroCHLOROthiazide  ibuprofen  primidone  tamsulosin capsule    Subjective  No Known Allergies     Past Medical History:   Diagnosis Date    Arthritis     Cancer     HL (hearing loss)  + or -    Hypercholesterolemia 3/24/2023    Hypertension     Migraine     Shingles  or     TIA (transient ischemic attack) 3/24/2023    Vision loss        Past Surgical History:   Procedure Laterality Date    APPENDECTOMY         Family History   Problem Relation Age of Onset    Cancer Mother     Alzheimer's disease Mother     Heart disease Father     Hypertension Father     Heart disease Brother     Hypertension Brother     Dementia Maternal Grandmother         Social History     Socioeconomic History    Marital status:    Tobacco Use    Smoking status: Former     Current packs/day: 0.00     Average packs/day: 0.5 packs/day for 10.0 years (5.0 ttl pk-yrs)     Types: Cigarettes     Start date: 1957     Quit date: 1967     Years since quittin.4    Smokeless tobacco: Never   Vaping Use    Vaping status: Never Used  "  Substance and Sexual Activity    Alcohol use: Yes     Alcohol/week: 2.0 standard drinks of alcohol     Types: 2 Glasses of wine per week    Drug use: No    Sexual activity: Yes     Partners: Female       Review of Systems   Constitutional: Negative.    HENT: Negative.     Respiratory: Negative.     Cardiovascular: Negative.    Endocrine: Negative.    Genitourinary: Negative.    Musculoskeletal: Negative.    Skin: Negative.    Allergic/Immunologic: Negative.    Neurological: Negative.    Hematological: Negative.    Psychiatric/Behavioral: Negative.         Cardiovascular Procedures    ECHO/MUGA:  STRESS TESTS:   CARDIAC CATH:   DEVICES:   HOLTER:   CT/MRI:   VASCULAR:   CARDIOTHORACIC:     Objective  Vitals:    05/15/24 1120 05/15/24 1150   BP: 162/80 160/80   BP Location: Right arm    Patient Position: Lying    Cuff Size: Adult    Pulse: 67    Resp: 16    SpO2: 99%    Weight: 93.4 kg (206 lb)    Height: 182.9 cm (72\")    PainSc: 0-No pain      Body mass index is 27.94 kg/m².     Physical Exam  Vitals reviewed.   Constitutional:       Appearance: Healthy appearance. Not in distress.   Eyes:      Pupils: Pupils are equal, round, and reactive to light.   HENT:    Mouth/Throat:      Pharynx: Oropharynx is clear.   Neck:      Thyroid: Thyroid normal.      Vascular: No JVR. JVD normal.   Pulmonary:      Effort: Pulmonary effort is normal.      Breath sounds: Normal breath sounds. No wheezing. No rhonchi. No rales.   Chest:      Chest wall: Not tender to palpatation.   Cardiovascular:      PMI at left midclavicular line. Normal rate. Regular rhythm. Normal S1. Normal S2.       Murmurs: There is no murmur.      No gallop.  No click. No rub.   Pulses:     Intact distal pulses.      Carotid: 3+ bilaterally.     Radial: 3+ bilaterally.     Femoral: 3+ bilaterally.     Dorsalis pedis: 3+ bilaterally.     Posterior tibial: 3+ bilaterally.  Edema:     Peripheral edema absent.   Abdominal:      General: Bowel sounds are normal. "      Palpations: Abdomen is soft.      Tenderness: There is no abdominal tenderness.   Musculoskeletal: Normal range of motion.         General: No tenderness.      Cervical back: Normal range of motion and neck supple. Skin:     General: Skin is warm and dry.   Neurological:      General: No focal deficit present.      Mental Status: Alert and oriented to person, place and time.        Diagnostic Data    ECG 12 Lead    Date/Time: 5/15/2024 12:43 PM  Performed by: Bhavesh Greenwood MD    Authorized by: Bhavesh Greenwood MD  Comparison: compared with previous ECG from 2/19/2023  Similar to previous ECG  Rhythm: sinus rhythm  Ectopy: atrial premature contractions  Rate: normal  BPM: 64  Conduction: left anterior fascicular block and 1st degree AV block  Q waves: III and aVF    QRS axis: left  Other findings: non-specific ST-T wave changes    Clinical impression: abnormal EKG        Assessment and Plan  Diagnoses and all orders for this visit:    Bifascicular block- first degree AV block and lASHB, Hr steady, no syncope,    Essential hypertension- BP is 160.80, will d.,c Nifedipine and will use Amlodipine 10 mg, keep candesartan 32 mg and also HCTZ 12,5, advised to call us in 3 weeks if SBP is still high    Hypercholesterolemia- On no meds, he refuses    right carotid bruit- mild disease by carotid doppler    TIA (transient ischemic attack)    Other orders  -     amLODIPine (NORVASC) 10 MG tablet; Take 1 tablet by mouth Daily.         Return in about 6 months (around 11/15/2024) for Recheck with Dr. Greenwood.    Bhavesh Greenwood MD  05/15/2024

## 2024-06-04 RX ORDER — HYDROCHLOROTHIAZIDE 12.5 MG/1
12.5 CAPSULE, GELATIN COATED ORAL EVERY MORNING
Qty: 90 CAPSULE | Refills: 3 | Status: SHIPPED | OUTPATIENT
Start: 2024-06-04

## 2024-06-06 ENCOUNTER — TELEPHONE (OUTPATIENT)
Dept: FAMILY MEDICINE CLINIC | Facility: CLINIC | Age: 84
End: 2024-06-06
Payer: MEDICARE

## 2024-06-06 NOTE — TELEPHONE ENCOUNTER
Attempted to schedule patient for medicare wellness exam. Patient has switched offices to Dr. Mathew's office. Thanks.

## 2024-07-10 RX ORDER — PRIMIDONE 50 MG/1
50 TABLET ORAL NIGHTLY
Qty: 90 TABLET | Refills: 3 | OUTPATIENT
Start: 2024-07-10

## 2024-07-10 NOTE — TELEPHONE ENCOUNTER
Rx Refill Note  Requested Prescriptions     Pending Prescriptions Disp Refills    primidone (MYSOLINE) 50 MG tablet [Pharmacy Med Name: PRIMIDONE 50 MG TABLET] 90 tablet 3     Sig: TAKE ONE TABLET BY MOUTH ONCE NIGHTLY      Last filled:  07/18/2023 w/3  Last office visit with prescribing clinician: 2/6/2023      Next office visit with prescribing clinician: Visit date not found     Silvia Harrington MA  07/10/24, 08:29 EDT

## 2024-08-01 RX ORDER — PRIMIDONE 50 MG/1
50 TABLET ORAL NIGHTLY
Qty: 90 TABLET | Refills: 1 | Status: SHIPPED | OUTPATIENT
Start: 2024-08-01

## 2024-08-01 NOTE — TELEPHONE ENCOUNTER
Pharmacy called to state that medication is contraindicated to his nifedipine.  Informed to pharmacy to cancel refill and I will contact the patient.

## 2024-08-01 NOTE — TELEPHONE ENCOUNTER
Rx Refill Note  Requested Prescriptions     Pending Prescriptions Disp Refills    primidone (MYSOLINE) 50 MG tablet [Pharmacy Med Name: PRIMIDONE 50 MG TABLET] 90 tablet 3     Sig: TAKE ONE TABLET BY MOUTH ONCE NIGHTLY      Last filled:  07/18/2023 w/3  Last office visit with prescribing clinician: 2/6/2023      Next office visit with prescribing clinician: Visit date not found     Silvia Harrington MA  08/01/24, 10:15 EDT

## 2024-10-18 ENCOUNTER — TELEPHONE (OUTPATIENT)
Dept: CARDIOLOGY | Facility: CLINIC | Age: 84
End: 2024-10-18
Payer: MEDICARE

## 2024-10-18 NOTE — TELEPHONE ENCOUNTER
Hub staff attempted to follow warm transfer process and was unsuccessful     Caller: Bryan Khan    Relationship to patient: Self    Best call back number: [phone]     Patient is needing: PT WANTED TO SPEAK WITH OFFICE ABOUT MOVING APPT UP DUE TO HIGH BLOOD PRESSURE, UNABLE TO WT CALL. PLEASE ADVISE.     HUB ONLY WT CALL DUE TO PT WANTING TO SPEAK DIRECTLY WITH OFFICE- ONLY AFTER WT UNSUCCESSFUL DID PT SHARE MORE INFO FOR CHART TO BE OPENED.     IN OFFICE, UNSURE WHAT BP IS AT TIME OF CALL

## 2024-10-24 ENCOUNTER — OFFICE VISIT (OUTPATIENT)
Dept: CARDIOLOGY | Facility: CLINIC | Age: 84
End: 2024-10-24
Payer: MEDICARE

## 2024-10-24 VITALS
HEIGHT: 72 IN | HEART RATE: 74 BPM | OXYGEN SATURATION: 99 % | SYSTOLIC BLOOD PRESSURE: 160 MMHG | WEIGHT: 206 LBS | RESPIRATION RATE: 18 BRPM | DIASTOLIC BLOOD PRESSURE: 80 MMHG | TEMPERATURE: 97 F | BODY MASS INDEX: 27.9 KG/M2

## 2024-10-24 DIAGNOSIS — R09.89 LEFT CAROTID BRUIT: ICD-10-CM

## 2024-10-24 DIAGNOSIS — I10 ESSENTIAL HYPERTENSION: Primary | ICD-10-CM

## 2024-10-24 DIAGNOSIS — I45.2 BIFASCICULAR BLOCK: ICD-10-CM

## 2024-10-24 DIAGNOSIS — E78.00 HYPERCHOLESTEROLEMIA: ICD-10-CM

## 2024-10-24 PROCEDURE — 1159F MED LIST DOCD IN RCRD: CPT | Performed by: INTERNAL MEDICINE

## 2024-10-24 PROCEDURE — 1160F RVW MEDS BY RX/DR IN RCRD: CPT | Performed by: INTERNAL MEDICINE

## 2024-10-24 PROCEDURE — 3079F DIAST BP 80-89 MM HG: CPT | Performed by: INTERNAL MEDICINE

## 2024-10-24 PROCEDURE — 3077F SYST BP >= 140 MM HG: CPT | Performed by: INTERNAL MEDICINE

## 2024-10-24 PROCEDURE — 99214 OFFICE O/P EST MOD 30 MIN: CPT | Performed by: INTERNAL MEDICINE

## 2024-10-24 RX ORDER — SPIRONOLACTONE 25 MG/1
25 TABLET ORAL DAILY
Qty: 90 TABLET | Refills: 3 | Status: SHIPPED | OUTPATIENT
Start: 2024-10-24 | End: 2024-10-24 | Stop reason: ALTCHOICE

## 2024-10-24 NOTE — PROGRESS NOTES
[facility] [place]  [facility] CARDIOLOGY  [address] [place] [address]  Dept: [phone]  Dept Fax: [phone]    Bryan Khan  1940    Follow Up Office Visit Note    History of Present Illness:  Bryan Khan is a 83 y.o. male who presents to the clinic for Follow-up. Hypertension- The BP  is better 140.80 but still high on Amlodipine 10 mg,Candesartan 32 mg and HCTZ 12,5 daily,  he has been taking some Meloxican and he thinks that is the reason why the BP is high,  BP here is 140.80 no major edema, will advised to stop Meloxican and  see in 2 month if the BP is better, other wise will use Aldactone,    The following portions of the patient's history were reviewed and updated as appropriate: allergies, current medications, past family history, past medical history, past social history, past surgical history, and problem list.    Medications:  amLODIPine  candesartan  fluorometholone  hydroCHLOROthiazide  ibuprofen  primidone  tamsulosin capsule    Subjective  No Known Allergies     Past Medical History:   Diagnosis Date    Arthritis     Cancer     HL (hearing loss)  + or -    Hypercholesterolemia 3/24/2023    Hypertension     Migraine     Shingles  or     TIA (transient ischemic attack) 3/24/2023    Vision loss        Past Surgical History:   Procedure Laterality Date    APPENDECTOMY         Family History   Problem Relation Age of Onset    Cancer Mother     Alzheimer's disease Mother     Heart disease Father     Hypertension Father     Heart disease Brother     Hypertension Brother     Dementia Maternal Grandmother         Social History     Socioeconomic History    Marital status:    Tobacco Use    Smoking status: Former     Current packs/day: 0.00     Average packs/day: 0.5 packs/day for 10.0 years (5.0 ttl pk-yrs)     Types: Cigarettes     Start date: 1957     Quit date: 1967     Years since quittin.8    Smokeless tobacco: Never   Vaping  "Use    Vaping status: Never Used   Substance and Sexual Activity    Alcohol use: Yes     Alcohol/week: 2.0 standard drinks of alcohol     Types: 2 Glasses of wine per week    Drug use: No    Sexual activity: Yes     Partners: Female       Review of Systems   Constitutional: Negative.    HENT: Negative.     Respiratory: Negative.     Cardiovascular: Negative.    Endocrine: Negative.    Genitourinary: Negative.    Musculoskeletal: Negative.    Skin: Negative.    Allergic/Immunologic: Negative.    Neurological: Negative.    Hematological: Negative.    Psychiatric/Behavioral: Negative.         Cardiovascular Procedures    ECHO/MUGA:  STRESS TESTS:   CARDIAC CATH:   DEVICES:   HOLTER:   CT/MRI:   VASCULAR:   CARDIOTHORACIC:     Objective  Vitals:    10/24/24 1306   BP: 160/80   BP Location: Right arm   Patient Position: Lying   Cuff Size: Adult   Pulse: 74   Resp: 18   Temp: 97 °F (36.1 °C)   TempSrc: Infrared   SpO2: 99%   Weight: 93.4 kg (206 lb)   Height: 182.9 cm (72\")   PainSc: 0-No pain     Body mass index is 27.94 kg/m².     Physical Exam  Constitutional:       Appearance: Healthy appearance. Not in distress.   Neck:      Vascular: No JVR. JVD normal.   Pulmonary:      Effort: Pulmonary effort is normal.      Breath sounds: Normal breath sounds. No wheezing. No rhonchi. No rales.   Chest:      Chest wall: Not tender to palpatation.   Cardiovascular:      PMI at left midclavicular line. Normal rate. Regular rhythm. Normal S1. Normal S2.       Murmurs: There is no murmur.      No gallop.  No click. No rub.   Pulses:     Intact distal pulses.   Edema:     Peripheral edema absent.   Abdominal:      General: Bowel sounds are normal.      Palpations: Abdomen is soft.      Tenderness: There is no abdominal tenderness.   Musculoskeletal: Normal range of motion.         General: No tenderness. Skin:     General: Skin is warm and dry.   Neurological:      General: No focal deficit present.      Mental Status: Alert and " oriented to person, place and time.          Diagnostic Data  Procedures    Assessment and Plan  Diagnoses and all orders for this visit:    Essential hypertension- BP is better 140.80, on candesartan 32 mg, Amlodipine 10 mg and also HCTZ 12,5, will d,c Meloxican and see if the BP gets better     Hypercholesterolemia- on Lipitor 40 mg , we need a Lipid profile  Bifascicular block- No changes HR is 74    right carotid bruit- Mild disease by doppler    Other orders  -     Discontinue: spironolactone (ALDACTONE) 25 MG tablet; Take 1 tablet by mouth Daily.         Return in about 6 months (around 4/24/2025) for Recheck with Dr. Greenwood.    Bhavesh Greenwood MD  10/24/2024

## 2024-12-16 ENCOUNTER — TELEPHONE (OUTPATIENT)
Dept: CARDIOLOGY | Facility: CLINIC | Age: 84
End: 2024-12-16
Payer: MEDICARE

## 2024-12-16 NOTE — TELEPHONE ENCOUNTER
Caller: BillBryan    Relationship: Self    Best call back number: [phone]    What is the best time to reach you: ANYTIME AFTER 10 AM    Who are you requesting to speak with (clinical staff, provider,  specific staff member): CLINICAL    What was the call regarding: PATIENT CALLED BACK REGARDING APPT NOTE TO RESCHEDULE HIS APPT ON 12/18. HUB INFORMED OF OFFICE CLOSURE TILL AT LEAST 1:15 ON 12/18. MESSAGE WAS RELAYED TO PT, BUT HE STILL WANTS A CALL BACK BECAUSE HE HAS A BUSY SCHEDULE THIS WEEK AND HE WANTS TO TALK TO A NURSE OVER THE PHONE IF POSSIBLE. PLEASE CALL HIM BACK WHEN POSSIBLE     Is it okay if the provider responds through [facility]: PLEASE CALL

## 2024-12-24 ENCOUNTER — CLINICAL SUPPORT (OUTPATIENT)
Dept: CARDIOLOGY | Facility: CLINIC | Age: 84
End: 2024-12-24
Payer: MEDICARE

## 2024-12-24 ENCOUNTER — TELEPHONE (OUTPATIENT)
Dept: CARDIOLOGY | Facility: CLINIC | Age: 84
End: 2024-12-24
Payer: MEDICARE

## 2024-12-24 VITALS — SYSTOLIC BLOOD PRESSURE: 146 MMHG | DIASTOLIC BLOOD PRESSURE: 82 MMHG

## 2024-12-24 RX ORDER — SPIRONOLACTONE 25 MG/1
25 TABLET ORAL DAILY
Qty: 30 TABLET | Refills: 1 | Status: SHIPPED | OUTPATIENT
Start: 2024-12-24

## 2024-12-24 NOTE — PROGRESS NOTES
Patient came in for BP check per Dr Greenwood request please see vitals for results sitting and laying. No complains at this time, confirmed medication. Discussed with Dr Greenwood, he would like to see his blood pressure at least 5-10 points lower consistently. He would like to add spironolactone 25mg daily. Recheck BMP in one month for K+ levels and BP check. Patient left office will call.

## 2024-12-24 NOTE — TELEPHONE ENCOUNTER
Patient came in for BP check per Dr Greenwood request please see vitals for results sitting and laying. No complains at this time, confirmed medication. Discussed with Dr Greenwood, he would like to see his blood pressure at least 5-10 points lower consistently. He would like to add spironolactone 25mg daily. Recheck BMP in one month for K+ levels and BP check. Patient left office will call.   Lvm to call back. Sent medication to pharmacy

## 2025-01-02 ENCOUNTER — TELEPHONE (OUTPATIENT)
Dept: CARDIOLOGY | Facility: CLINIC | Age: 85
End: 2025-01-02
Payer: MEDICARE

## 2025-01-02 NOTE — TELEPHONE ENCOUNTER
Lvm to inform pt that per  last office note pt was to d/c spironolactone and start hydrocholorthiazide.

## 2025-01-02 NOTE — TELEPHONE ENCOUNTER
Caller: Bryan Khan    Relationship: Self    Best call back number: [phone]    Which medication are you concerned about: HYDROCHLOROTHIAZIDE AND SPIRONOLACTONE    Who prescribed you this medication: DR JAIME    When did you start taking this medication: STARTED TAKING THE SPIRONOLACTONE BUT STOPPED TAKING THE HYDROCHLOROTHIAZIDE    What are your concerns: PATIENT WANTS TO KNOW IF THE SPIRONOLACTONE IS SUPPOSED TO REPLACE THE HYDROCHLOROTHIAZIDE OR IF HE'S SUPPOSED TO BE TAKING BOTH. THE PHARMACIST TOLD HIM ITS MOST LIKELY TO REPLACE IT, BUT HE WANTS TO BE SURE. PLEASE CALL WHEN POSSIBLE TO ADVISE. OKAY TO LEAVE VM. THANK YOU

## 2025-01-03 NOTE — TELEPHONE ENCOUNTER
"After further review of patient chart, please disregard previous message on 1/2/24. He was in for BP check on 12/24/24 Dr Greenwood advised as follows \"  Patient came in for BP check per Dr Greenwood request please see vitals for results sitting and laying. No complains at this time, confirmed medication. Discussed with Dr Greenwood, he would like to see his blood pressure at least 5-10 points lower consistently. He would like to add spironolactone 25mg daily. Recheck BMP in one month for K+ levels and BP check. Patient left office will call.   m to call back. Sent medication to pharmacy \"    Discussed with patient in detail Dr Greenwood's instructions. Patient asked when he should take the added spironolactone, advised that he could like it at night since he is taking the HCTZ in the morning to try to lessen the effects it has all at one time. However, his pharmacist can best tell him when the medications are best adsorbed. Patient verbalized understanding and will return in one month after starting new medication.    "

## 2025-01-24 RX ORDER — PRIMIDONE 50 MG/1
TABLET ORAL
Qty: 90 TABLET | Refills: 1 | OUTPATIENT
Start: 2025-01-24

## 2025-01-24 NOTE — TELEPHONE ENCOUNTER
Rx Refill Note  Requested Prescriptions     Pending Prescriptions Disp Refills    primidone (MYSOLINE) 50 MG tablet [Pharmacy Med Name: PRIMIDONE 50 MG TABLET] 90 tablet 1     Sig: TAKE ONE TABLET BY MOUTH ONCE NIGHTLY * NEED APPOINTMENT FOR REFILL*      Last filled:  08/01/24 romeo/mena vallejo msg  Last office visit with prescribing clinician: 2/6/2023      Next office visit with prescribing clinician: Visit date not found     Silvia Harrington MA  01/24/25, 07:55 EST

## 2025-01-30 ENCOUNTER — CLINICAL SUPPORT (OUTPATIENT)
Dept: CARDIOLOGY | Facility: CLINIC | Age: 85
End: 2025-01-30
Payer: MEDICARE

## 2025-01-30 DIAGNOSIS — I10 ESSENTIAL HYPERTENSION: Primary | ICD-10-CM

## 2025-01-30 DIAGNOSIS — E78.00 HYPERCHOLESTEROLEMIA: ICD-10-CM

## 2025-01-30 PROCEDURE — 36415 COLL VENOUS BLD VENIPUNCTURE: CPT | Performed by: INTERNAL MEDICINE

## 2025-01-31 ENCOUNTER — TELEPHONE (OUTPATIENT)
Dept: CARDIOLOGY | Facility: CLINIC | Age: 85
End: 2025-01-31
Payer: MEDICARE

## 2025-01-31 LAB
BUN SERPL-MCNC: 16 MG/DL (ref 8–27)
BUN/CREAT SERPL: 17 (ref 10–24)
CALCIUM SERPL-MCNC: 9.3 MG/DL (ref 8.6–10.2)
CHLORIDE SERPL-SCNC: 90 MMOL/L (ref 96–106)
CO2 SERPL-SCNC: 21 MMOL/L (ref 20–29)
CREAT SERPL-MCNC: 0.95 MG/DL (ref 0.76–1.27)
EGFRCR SERPLBLD CKD-EPI 2021: 79 ML/MIN/1.73
GLUCOSE SERPL-MCNC: 123 MG/DL (ref 70–99)
POTASSIUM SERPL-SCNC: 4.8 MMOL/L (ref 3.5–5.2)
SODIUM SERPL-SCNC: 128 MMOL/L (ref 134–144)
TSH SERPL DL<=0.005 MIU/L-ACNC: 0.5 UIU/ML (ref 0.45–4.5)

## 2025-01-31 NOTE — TELEPHONE ENCOUNTER
----- Message from Mayda CLARK sent at 1/31/2025  4:32 PM EST -----    ----- Message -----  From: Mayda Carranza RN  Sent: 1/31/2025   4:23 PM EST  To: Sue Shah MA      ----- Message -----  From: Bhavesh Greenwood MD  Sent: 1/31/2025   4:17 PM EST  To: Mayda Carranza RN    Sodium is low 128, likely related to Aldactone that was recently added, unfortunately will have to d,c it and will use Hydralazine instead, 50 mg bid

## 2025-01-31 NOTE — TELEPHONE ENCOUNTER
Spoke with Mr. Khan and advised him of Dr. Greenwood's recommendations from your lab work:    Sodium is low 128, likely related to Aldactone that was recently added, unfortunately will have to d,c it and will use Hydralazine instead, 50 mg bid     Mr. Khan wants to discontinue the Aldactone but not start the hydralazine 50mg.  Explained that per Dr. Greenwood we can try that but please monitor you BP and is starts to increase will need to add the hydralazine 50mg bid.  He verbalized understanding and will come in 2 weeks for a BMP to recheck his sodium level.

## 2025-02-12 NOTE — PROGRESS NOTES
Venipuncture Blood Specimen Collection  Venipuncture performed in clinic by Jessica Fairbanks RN with good hemostasis. Patient tolerated the procedure well without complications.   02/12/25   Jessica Fairbanks RN

## 2025-02-13 ENCOUNTER — CLINICAL SUPPORT (OUTPATIENT)
Dept: CARDIOLOGY | Facility: CLINIC | Age: 85
End: 2025-02-13
Payer: MEDICARE

## 2025-02-13 ENCOUNTER — TELEPHONE (OUTPATIENT)
Dept: CARDIOLOGY | Facility: CLINIC | Age: 85
End: 2025-02-13

## 2025-02-13 DIAGNOSIS — I10 ESSENTIAL HYPERTENSION: Primary | ICD-10-CM

## 2025-02-13 PROCEDURE — 36415 COLL VENOUS BLD VENIPUNCTURE: CPT | Performed by: INTERNAL MEDICINE

## 2025-02-13 NOTE — PROGRESS NOTES
Venipuncture Blood Specimen Collection  Venipuncture performed in clinic by Jessica Fairbanks RN with good hemostasis. Patient tolerated the procedure well without complications.   02/13/25   Jessica Fairbanks RN

## 2025-02-14 ENCOUNTER — TELEPHONE (OUTPATIENT)
Dept: CARDIOLOGY | Facility: CLINIC | Age: 85
End: 2025-02-14
Payer: MEDICARE

## 2025-02-14 LAB
BUN SERPL-MCNC: 10 MG/DL (ref 8–27)
BUN/CREAT SERPL: 13 (ref 10–24)
CALCIUM SERPL-MCNC: 9.4 MG/DL (ref 8.6–10.2)
CHLORIDE SERPL-SCNC: 92 MMOL/L (ref 96–106)
CO2 SERPL-SCNC: 24 MMOL/L (ref 20–29)
CREAT SERPL-MCNC: 0.78 MG/DL (ref 0.76–1.27)
EGFRCR SERPLBLD CKD-EPI 2021: 88 ML/MIN/1.73
GLUCOSE SERPL-MCNC: 95 MG/DL (ref 70–99)
POTASSIUM SERPL-SCNC: 4.6 MMOL/L (ref 3.5–5.2)
SODIUM SERPL-SCNC: 130 MMOL/L (ref 134–144)

## 2025-02-14 NOTE — TELEPHONE ENCOUNTER
----- Message from Mayda CLARK sent at 2/14/2025  1:35 PM EST -----    ----- Message -----  From: Bhavesh Greenwood MD  Sent: 2/14/2025  10:36 AM EST  To: Mayda Carranza RN    Sodium is better 130,, not yet normal but likely will go up in the next few weeks,  please be sure you check your BP, as we stopped the Aldactone might need another meds instead. Pt said he is checking b/p . He understands message

## 2025-02-18 RX ORDER — SPIRONOLACTONE 25 MG/1
25 TABLET ORAL DAILY
Qty: 30 TABLET | Refills: 1 | Status: SHIPPED | OUTPATIENT
Start: 2025-02-18

## 2025-02-27 RX ORDER — CANDESARTAN 32 MG/1
32 TABLET ORAL DAILY
Qty: 90 TABLET | Refills: 3 | Status: SHIPPED | OUTPATIENT
Start: 2025-02-27

## 2025-04-24 ENCOUNTER — OFFICE VISIT (OUTPATIENT)
Dept: CARDIOLOGY | Facility: CLINIC | Age: 85
End: 2025-04-24
Payer: MEDICARE

## 2025-04-24 VITALS
HEIGHT: 72 IN | HEART RATE: 73 BPM | OXYGEN SATURATION: 99 % | SYSTOLIC BLOOD PRESSURE: 144 MMHG | RESPIRATION RATE: 18 BRPM | WEIGHT: 205 LBS | DIASTOLIC BLOOD PRESSURE: 82 MMHG | BODY MASS INDEX: 27.77 KG/M2 | TEMPERATURE: 98 F

## 2025-04-24 DIAGNOSIS — E78.00 HYPERCHOLESTEROLEMIA: ICD-10-CM

## 2025-04-24 DIAGNOSIS — I45.2 BIFASCICULAR BLOCK: Primary | ICD-10-CM

## 2025-04-24 DIAGNOSIS — I10 ESSENTIAL HYPERTENSION: ICD-10-CM

## 2025-04-24 DIAGNOSIS — R09.89 LEFT CAROTID BRUIT: ICD-10-CM

## 2025-04-24 RX ORDER — HYDRALAZINE HYDROCHLORIDE 25 MG/1
25 TABLET, FILM COATED ORAL 3 TIMES DAILY
Qty: 60 TABLET | Refills: 6 | Status: SHIPPED | OUTPATIENT
Start: 2025-04-24 | End: 2025-04-24

## 2025-04-24 RX ORDER — HYDRALAZINE HYDROCHLORIDE 25 MG/1
25 TABLET, FILM COATED ORAL 2 TIMES DAILY
Qty: 60 TABLET | Refills: 6 | Status: SHIPPED | OUTPATIENT
Start: 2025-04-24

## 2025-04-24 NOTE — PROGRESS NOTES
[facility] [place]  [facility] CARDIOLOGY  [address] [place] [address]  Dept: [phone]  Dept Fax: [phone]    Bryan Khan  1940    Follow Up Office Visit Note    History of Present Illness:  Bryan Khan is a 84 y.o. male who presents to the clinic for Follow-up and Hypertension.  The BP is 145.80 on Amlodipine 10 mg, candesartan 32 mg and also HCTZ 12,5 we have used Aldactone but his sodium went down, so we d,c it, will use now Hydralazine 25 bid    The following portions of the patient's history were reviewed and updated as appropriate: allergies, current medications, past family history, past medical history, past social history, past surgical history, and problem list.    Medications:  amLODIPine  candesartan  hydrALAZINE  hydroCHLOROthiazide  primidone  tamsulosin capsule    Subjective  No Known Allergies     Past Medical History:   Diagnosis Date   • Arthritis    • Cancer    • HL (hearing loss)  + or -   • Hypercholesterolemia 3/24/2023   • Hypertension    • Migraine    • Shingles  or    • TIA (transient ischemic attack) 3/24/2023   • Vision loss        Past Surgical History:   Procedure Laterality Date   • APPENDECTOMY         Family History   Problem Relation Age of Onset   • Cancer Mother    • Alzheimer's disease Mother    • Heart disease Father    • Hypertension Father    • Heart disease Brother    • Hypertension Brother    • Dementia Maternal Grandmother         Social History     Socioeconomic History   • Marital status:    Tobacco Use   • Smoking status: Former     Current packs/day: 0.00     Average packs/day: 0.5 packs/day for 10.0 years (5.0 ttl pk-yrs)     Types: Cigarettes     Start date: 1957     Quit date: 1967     Years since quittin.3     Passive exposure: Past   • Smokeless tobacco: Never   Vaping Use   • Vaping status: Never Used   Substance and Sexual Activity   • Alcohol use: Yes     Alcohol/week: 2.0 standard  "drinks of alcohol     Types: 2 Glasses of wine per week   • Drug use: No   • Sexual activity: Yes     Partners: Female       Review of Systems   Constitutional: Negative.    HENT: Negative.     Respiratory: Negative.     Cardiovascular: Negative.    Endocrine: Negative.    Genitourinary: Negative.    Musculoskeletal: Negative.    Skin: Negative.    Allergic/Immunologic: Negative.    Neurological: Negative.    Hematological: Negative.    Psychiatric/Behavioral: Negative.       Cardiovascular Procedures    ECHO/MUGA:  STRESS TESTS:   CARDIAC CATH:   DEVICES:   HOLTER:   CT/MRI:   VASCULAR:   CARDIOTHORACIC:     Objective  Vitals:    04/24/25 1316   BP: 144/82   BP Location: Right arm   Patient Position: Sitting   Cuff Size: Adult   Pulse: 73   Resp: 18   Temp: 98 °F (36.7 °C)   TempSrc: Infrared   SpO2: 99%   Weight: 93 kg (205 lb)   Height: 182.9 cm (72\")   PainSc: 0-No pain     Body mass index is 27.8 kg/m².     Physical Exam  Vitals reviewed.   Constitutional:       Appearance: Healthy appearance. Not in distress.   Eyes:      Pupils: Pupils are equal, round, and reactive to light.   HENT:    Mouth/Throat:      Pharynx: Oropharynx is clear.   Neck:      Thyroid: Thyroid normal.      Vascular: No JVR. JVD normal.   Pulmonary:      Effort: Pulmonary effort is normal.      Breath sounds: Normal breath sounds. No wheezing. No rhonchi. No rales.   Chest:      Chest wall: Not tender to palpatation.   Cardiovascular:      PMI at left midclavicular line. Normal rate. Regular rhythm. Normal S1. Normal S2.       Murmurs: There is no murmur.      No gallop.  No click. No rub.   Pulses:     Intact distal pulses.      Carotid: 3+ bilaterally.     Radial: 3+ bilaterally.     Femoral: 3+ bilaterally.     Dorsalis pedis: 3+ bilaterally.     Posterior tibial: 3+ bilaterally.  Edema:     Peripheral edema absent.   Abdominal:      General: Bowel sounds are normal.      Palpations: Abdomen is soft.      Tenderness: There is no " abdominal tenderness.   Musculoskeletal: Normal range of motion.         General: No tenderness.      Cervical back: Normal range of motion and neck supple. Skin:     General: Skin is warm and dry.   Neurological:      General: No focal deficit present.      Mental Status: Alert and oriented to person, place and time.        Diagnostic Data    ECG 12 Lead    Date/Time: 4/24/2025 1:46 PM  Performed by: Bhavesh Greenwood MD    Authorized by: Bhavesh Greenwood MD  Comparison: compared with previous ECG from 5/15/2024  Similar to previous ECG  Rhythm: sinus rhythm  Rate: normal  BPM: 63  Conduction: left anterior fascicular block and 1st degree AV block  QRS axis: left    Clinical impression: abnormal EKG      Assessment and Plan  Diagnoses and all orders for this visit:    Bifascicular block- This is steady hr 63 first degree AV block and lASHB    Essential hypertension- BP is 145.80, will add Hydralazine 25 bid, keep Amlodipine 10 mg, Candesartan 32mg and also HCTZ 12,5    Hypercholesterolemia- On Lipitor 40 mg, tolerates well    right carotid bruit-Mild disease by doppler 2023 less than 40%    Other orders  -     Discontinue: hydrALAZINE (APRESOLINE) 25 MG tablet; Take 1 tablet by mouth 3 (Three) Times a Day.  -     hydrALAZINE (APRESOLINE) 25 MG tablet; Take 1 tablet by mouth 2 (Two) Times a Day.         Return in about 6 months (around 10/24/2025) for Recheck with Dr. Greenwood.    Bhavesh Greenwood MD  04/24/2025

## 2025-05-05 RX ORDER — AMLODIPINE BESYLATE 10 MG/1
10 TABLET ORAL DAILY
Qty: 90 TABLET | Refills: 3 | Status: SHIPPED | OUTPATIENT
Start: 2025-05-05

## 2025-05-27 RX ORDER — HYDROCHLOROTHIAZIDE 12.5 MG/1
12.5 CAPSULE ORAL EVERY MORNING
Qty: 90 CAPSULE | Refills: 3 | Status: SHIPPED | OUTPATIENT
Start: 2025-05-27